# Patient Record
Sex: FEMALE | Race: OTHER | NOT HISPANIC OR LATINO
[De-identification: names, ages, dates, MRNs, and addresses within clinical notes are randomized per-mention and may not be internally consistent; named-entity substitution may affect disease eponyms.]

---

## 2018-08-28 ENCOUNTER — APPOINTMENT (OUTPATIENT)
Dept: GYNECOLOGIC ONCOLOGY | Facility: CLINIC | Age: 78
End: 2018-08-28
Payer: MEDICARE

## 2018-08-28 ENCOUNTER — RECORD ABSTRACTING (OUTPATIENT)
Age: 78
End: 2018-08-28

## 2018-08-28 VITALS
BODY MASS INDEX: 20.09 KG/M2 | DIASTOLIC BLOOD PRESSURE: 60 MMHG | HEIGHT: 66 IN | WEIGHT: 125 LBS | SYSTOLIC BLOOD PRESSURE: 166 MMHG

## 2018-08-28 DIAGNOSIS — M79.606 PAIN IN LEG, UNSPECIFIED: ICD-10-CM

## 2018-08-28 DIAGNOSIS — Z86.39 PERSONAL HISTORY OF OTHER ENDOCRINE, NUTRITIONAL AND METABOLIC DISEASE: ICD-10-CM

## 2018-08-28 DIAGNOSIS — Z87.39 PERSONAL HISTORY OF OTHER DISEASES OF THE MUSCULOSKELETAL SYSTEM AND CONNECTIVE TISSUE: ICD-10-CM

## 2018-08-28 DIAGNOSIS — N94.9 UNSPECIFIED CONDITION ASSOCIATED WITH FEMALE GENITAL ORGANS AND MENSTRUAL CYCLE: ICD-10-CM

## 2018-08-28 DIAGNOSIS — M54.5 LOW BACK PAIN: ICD-10-CM

## 2018-08-28 DIAGNOSIS — Z86.79 PERSONAL HISTORY OF OTHER DISEASES OF THE CIRCULATORY SYSTEM: ICD-10-CM

## 2018-08-28 DIAGNOSIS — Z78.9 OTHER SPECIFIED HEALTH STATUS: ICD-10-CM

## 2018-08-28 DIAGNOSIS — I25.2 OLD MYOCARDIAL INFARCTION: ICD-10-CM

## 2018-08-28 DIAGNOSIS — Z80.3 FAMILY HISTORY OF MALIGNANT NEOPLASM OF BREAST: ICD-10-CM

## 2018-08-28 PROCEDURE — 99204 OFFICE O/P NEW MOD 45 MIN: CPT

## 2018-08-28 RX ORDER — INSULIN ASPART 100 [IU]/ML
INJECTION, SOLUTION INTRAVENOUS; SUBCUTANEOUS
Refills: 0 | Status: ACTIVE | COMMUNITY

## 2018-08-28 RX ORDER — TRAMADOL HYDROCHLORIDE 25 MG/1
TABLET, COATED ORAL
Refills: 0 | Status: ACTIVE | COMMUNITY

## 2018-08-28 RX ORDER — ENALAPRIL MALEATE 5 MG/1
TABLET ORAL
Refills: 0 | Status: ACTIVE | COMMUNITY

## 2020-03-25 ENCOUNTER — INPATIENT (INPATIENT)
Facility: HOSPITAL | Age: 80
LOS: 5 days | Discharge: HOME | DRG: 481 | End: 2020-03-31
Attending: FAMILY MEDICINE | Admitting: INTERNAL MEDICINE
Payer: MEDICARE

## 2020-03-25 ENCOUNTER — TRANSCRIPTION ENCOUNTER (OUTPATIENT)
Age: 80
End: 2020-03-25

## 2020-03-25 VITALS
HEART RATE: 78 BPM | RESPIRATION RATE: 15 BRPM | WEIGHT: 143.96 LBS | OXYGEN SATURATION: 97 % | SYSTOLIC BLOOD PRESSURE: 180 MMHG | DIASTOLIC BLOOD PRESSURE: 80 MMHG | TEMPERATURE: 97 F

## 2020-03-25 DIAGNOSIS — S72.002A FRACTURE OF UNSPECIFIED PART OF NECK OF LEFT FEMUR, INITIAL ENCOUNTER FOR CLOSED FRACTURE: ICD-10-CM

## 2020-03-25 DIAGNOSIS — Z98.890 OTHER SPECIFIED POSTPROCEDURAL STATES: Chronic | ICD-10-CM

## 2020-03-25 LAB
ALBUMIN SERPL ELPH-MCNC: 3.5 G/DL — SIGNIFICANT CHANGE UP (ref 3.3–5)
ALP SERPL-CCNC: 84 U/L — SIGNIFICANT CHANGE UP (ref 40–120)
ALT FLD-CCNC: 18 U/L — SIGNIFICANT CHANGE UP (ref 12–78)
ANION GAP SERPL CALC-SCNC: 7 MMOL/L — SIGNIFICANT CHANGE UP (ref 5–17)
APPEARANCE UR: ABNORMAL
APTT BLD: 33.2 SEC — SIGNIFICANT CHANGE UP (ref 28.5–37)
AST SERPL-CCNC: 13 U/L — LOW (ref 15–37)
BASOPHILS # BLD AUTO: 0.02 K/UL — SIGNIFICANT CHANGE UP (ref 0–0.2)
BASOPHILS NFR BLD AUTO: 0.2 % — SIGNIFICANT CHANGE UP (ref 0–2)
BILIRUB SERPL-MCNC: 0.6 MG/DL — SIGNIFICANT CHANGE UP (ref 0.2–1.2)
BILIRUB UR-MCNC: NEGATIVE — SIGNIFICANT CHANGE UP
BUN SERPL-MCNC: 17 MG/DL — SIGNIFICANT CHANGE UP (ref 7–23)
CALCIUM SERPL-MCNC: 9.3 MG/DL — SIGNIFICANT CHANGE UP (ref 8.5–10.1)
CHLORIDE SERPL-SCNC: 106 MMOL/L — SIGNIFICANT CHANGE UP (ref 96–108)
CO2 SERPL-SCNC: 28 MMOL/L — SIGNIFICANT CHANGE UP (ref 22–31)
COLOR SPEC: YELLOW — SIGNIFICANT CHANGE UP
CREAT SERPL-MCNC: 0.63 MG/DL — SIGNIFICANT CHANGE UP (ref 0.5–1.3)
DIFF PNL FLD: NEGATIVE — SIGNIFICANT CHANGE UP
EOSINOPHIL # BLD AUTO: 0.02 K/UL — SIGNIFICANT CHANGE UP (ref 0–0.5)
EOSINOPHIL NFR BLD AUTO: 0.2 % — SIGNIFICANT CHANGE UP (ref 0–6)
GLUCOSE SERPL-MCNC: 191 MG/DL — HIGH (ref 70–99)
GLUCOSE UR QL: 1000 MG/DL
HCT VFR BLD CALC: 39.1 % — SIGNIFICANT CHANGE UP (ref 34.5–45)
HGB BLD-MCNC: 13.5 G/DL — SIGNIFICANT CHANGE UP (ref 11.5–15.5)
IMM GRANULOCYTES NFR BLD AUTO: 0.7 % — SIGNIFICANT CHANGE UP (ref 0–1.5)
INR BLD: 1.07 RATIO — SIGNIFICANT CHANGE UP (ref 0.88–1.16)
KETONES UR-MCNC: ABNORMAL
LEUKOCYTE ESTERASE UR-ACNC: NEGATIVE — SIGNIFICANT CHANGE UP
LYMPHOCYTES # BLD AUTO: 1.02 K/UL — SIGNIFICANT CHANGE UP (ref 1–3.3)
LYMPHOCYTES # BLD AUTO: 9 % — LOW (ref 13–44)
MCHC RBC-ENTMCNC: 30 PG — SIGNIFICANT CHANGE UP (ref 27–34)
MCHC RBC-ENTMCNC: 34.5 GM/DL — SIGNIFICANT CHANGE UP (ref 32–36)
MCV RBC AUTO: 86.9 FL — SIGNIFICANT CHANGE UP (ref 80–100)
MONOCYTES # BLD AUTO: 0.83 K/UL — SIGNIFICANT CHANGE UP (ref 0–0.9)
MONOCYTES NFR BLD AUTO: 7.3 % — SIGNIFICANT CHANGE UP (ref 2–14)
NEUTROPHILS # BLD AUTO: 9.42 K/UL — HIGH (ref 1.8–7.4)
NEUTROPHILS NFR BLD AUTO: 82.6 % — HIGH (ref 43–77)
NITRITE UR-MCNC: NEGATIVE — SIGNIFICANT CHANGE UP
NRBC # BLD: 0 /100 WBCS — SIGNIFICANT CHANGE UP (ref 0–0)
PH UR: 8 — SIGNIFICANT CHANGE UP (ref 5–8)
PLATELET # BLD AUTO: 228 K/UL — SIGNIFICANT CHANGE UP (ref 150–400)
POTASSIUM SERPL-MCNC: 4 MMOL/L — SIGNIFICANT CHANGE UP (ref 3.5–5.3)
POTASSIUM SERPL-SCNC: 4 MMOL/L — SIGNIFICANT CHANGE UP (ref 3.5–5.3)
PROT SERPL-MCNC: 6.5 G/DL — SIGNIFICANT CHANGE UP (ref 6–8.3)
PROT UR-MCNC: 15
PROTHROM AB SERPL-ACNC: 12 SEC — SIGNIFICANT CHANGE UP (ref 10–12.9)
RBC # BLD: 4.5 M/UL — SIGNIFICANT CHANGE UP (ref 3.8–5.2)
RBC # FLD: 12.9 % — SIGNIFICANT CHANGE UP (ref 10.3–14.5)
SODIUM SERPL-SCNC: 141 MMOL/L — SIGNIFICANT CHANGE UP (ref 135–145)
SP GR SPEC: 1.01 — SIGNIFICANT CHANGE UP (ref 1.01–1.02)
UROBILINOGEN FLD QL: NEGATIVE — SIGNIFICANT CHANGE UP
WBC # BLD: 11.39 K/UL — HIGH (ref 3.8–10.5)
WBC # FLD AUTO: 11.39 K/UL — HIGH (ref 3.8–10.5)

## 2020-03-25 PROCEDURE — 73552 X-RAY EXAM OF FEMUR 2/>: CPT | Mod: 26,LT

## 2020-03-25 PROCEDURE — 71045 X-RAY EXAM CHEST 1 VIEW: CPT | Mod: 26

## 2020-03-25 PROCEDURE — 72100 X-RAY EXAM L-S SPINE 2/3 VWS: CPT | Mod: 26

## 2020-03-25 PROCEDURE — 99223 1ST HOSP IP/OBS HIGH 75: CPT | Mod: AI

## 2020-03-25 PROCEDURE — 73502 X-RAY EXAM HIP UNI 2-3 VIEWS: CPT | Mod: 26,LT

## 2020-03-25 PROCEDURE — 99285 EMERGENCY DEPT VISIT HI MDM: CPT

## 2020-03-25 RX ORDER — INFLUENZA VIRUS VACCINE 15; 15; 15; 15 UG/.5ML; UG/.5ML; UG/.5ML; UG/.5ML
0.5 SUSPENSION INTRAMUSCULAR ONCE
Refills: 0 | Status: DISCONTINUED | OUTPATIENT
Start: 2020-03-25 | End: 2020-03-31

## 2020-03-25 RX ORDER — HEPARIN SODIUM 5000 [USP'U]/ML
5000 INJECTION INTRAVENOUS; SUBCUTANEOUS EVERY 8 HOURS
Refills: 0 | Status: COMPLETED | OUTPATIENT
Start: 2020-03-25 | End: 2020-03-25

## 2020-03-25 RX ORDER — INSULIN LISPRO 100/ML
VIAL (ML) SUBCUTANEOUS
Refills: 0 | Status: DISCONTINUED | OUTPATIENT
Start: 2020-03-25 | End: 2020-03-26

## 2020-03-25 RX ORDER — DEXTROSE 50 % IN WATER 50 %
25 SYRINGE (ML) INTRAVENOUS ONCE
Refills: 0 | Status: DISCONTINUED | OUTPATIENT
Start: 2020-03-25 | End: 2020-03-26

## 2020-03-25 RX ORDER — OXYCODONE HYDROCHLORIDE 5 MG/1
5 TABLET ORAL EVERY 6 HOURS
Refills: 0 | Status: DISCONTINUED | OUTPATIENT
Start: 2020-03-25 | End: 2020-03-26

## 2020-03-25 RX ORDER — SODIUM CHLORIDE 9 MG/ML
1000 INJECTION, SOLUTION INTRAVENOUS
Refills: 0 | Status: DISCONTINUED | OUTPATIENT
Start: 2020-03-25 | End: 2020-03-26

## 2020-03-25 RX ORDER — INSULIN LISPRO 100/ML
VIAL (ML) SUBCUTANEOUS AT BEDTIME
Refills: 0 | Status: DISCONTINUED | OUTPATIENT
Start: 2020-03-25 | End: 2020-03-26

## 2020-03-25 RX ORDER — ONDANSETRON 8 MG/1
4 TABLET, FILM COATED ORAL EVERY 6 HOURS
Refills: 0 | Status: DISCONTINUED | OUTPATIENT
Start: 2020-03-25 | End: 2020-03-26

## 2020-03-25 RX ORDER — DEXTROSE 50 % IN WATER 50 %
15 SYRINGE (ML) INTRAVENOUS ONCE
Refills: 0 | Status: DISCONTINUED | OUTPATIENT
Start: 2020-03-25 | End: 2020-03-26

## 2020-03-25 RX ORDER — HYDRALAZINE HCL 50 MG
10 TABLET ORAL ONCE
Refills: 0 | Status: COMPLETED | OUTPATIENT
Start: 2020-03-25 | End: 2020-03-25

## 2020-03-25 RX ORDER — ACETAMINOPHEN 500 MG
650 TABLET ORAL EVERY 6 HOURS
Refills: 0 | Status: DISCONTINUED | OUTPATIENT
Start: 2020-03-25 | End: 2020-03-26

## 2020-03-25 RX ORDER — GLUCAGON INJECTION, SOLUTION 0.5 MG/.1ML
1 INJECTION, SOLUTION SUBCUTANEOUS ONCE
Refills: 0 | Status: DISCONTINUED | OUTPATIENT
Start: 2020-03-25 | End: 2020-03-26

## 2020-03-25 RX ORDER — TRAMADOL HYDROCHLORIDE 50 MG/1
50 TABLET ORAL EVERY 6 HOURS
Refills: 0 | Status: DISCONTINUED | OUTPATIENT
Start: 2020-03-25 | End: 2020-03-26

## 2020-03-25 RX ORDER — DEXTROSE 50 % IN WATER 50 %
12.5 SYRINGE (ML) INTRAVENOUS ONCE
Refills: 0 | Status: DISCONTINUED | OUTPATIENT
Start: 2020-03-25 | End: 2020-03-26

## 2020-03-25 RX ADMIN — Medication 1: at 22:02

## 2020-03-25 RX ADMIN — OXYCODONE HYDROCHLORIDE 5 MILLIGRAM(S): 5 TABLET ORAL at 18:34

## 2020-03-25 RX ADMIN — TRAMADOL HYDROCHLORIDE 50 MILLIGRAM(S): 50 TABLET ORAL at 22:00

## 2020-03-25 RX ADMIN — SODIUM CHLORIDE 75 MILLILITER(S): 9 INJECTION, SOLUTION INTRAVENOUS at 16:00

## 2020-03-25 RX ADMIN — HEPARIN SODIUM 5000 UNIT(S): 5000 INJECTION INTRAVENOUS; SUBCUTANEOUS at 18:51

## 2020-03-25 RX ADMIN — SODIUM CHLORIDE 75 MILLILITER(S): 9 INJECTION, SOLUTION INTRAVENOUS at 18:52

## 2020-03-25 RX ADMIN — Medication 10 MILLIGRAM(S): at 17:11

## 2020-03-25 RX ADMIN — Medication 2: at 16:55

## 2020-03-25 RX ADMIN — HEPARIN SODIUM 5000 UNIT(S): 5000 INJECTION INTRAVENOUS; SUBCUTANEOUS at 21:14

## 2020-03-25 RX ADMIN — TRAMADOL HYDROCHLORIDE 50 MILLIGRAM(S): 50 TABLET ORAL at 21:14

## 2020-03-25 NOTE — H&P ADULT - ASSESSMENT
Pt is a 80 yo F presenting from home after a mechanical fall in her yard resulting in L hip fracture. PMH DM2 and pinched nerve, HTN.     L Hip Fracture:   -planned for intramedullary benjamin placement.   -patient is medically optimized for hip surgery.   -pain control as ordered.   -EKG NSR without any acute ST-T wave changes/no ischemic changes  -DVT ppx after OK'ed by ortho.     HTN: BP mildly elevated, will resume home ACE-inhibitor    DM2: Will place on diabetic diet after planned procedure.   -SSI  -check A1c.     DVT ppx: SCD's    Plan of care discussed with patient as well as patients daughter Leonie via telephone at patients request. Pt is a 80 yo F presenting from home after a mechanical fall in her yard resulting in L hip fracture. PMH DM2 and pinched nerve, HTN.     L Hip Fracture:   -planned for intramedullary benjamin placement.   -patient is medically optimized for hip surgery.   -pain control as ordered.   -EKG NSR without any acute ST-T wave changes/no ischemic changes  -DVT ppx after OK'ed by ortho.     HTN: BP mildly elevated. Hydralazine PRN  -called [facility] but patient has not picked up enalapril since Jan 2019. Will reach out to [facility] to reconcile home medications    DM2: Will place on diabetic diet after planned procedure.   -SSI  -check A1c.     DVT ppx: SCD's    Plan of care discussed with patient as well as patients daughter Leonie via telephone at patients request.

## 2020-03-25 NOTE — H&P ADULT - NSHPPHYSICALEXAM_GEN_ALL_CORE
T(C): 36.7 (03-25-20 @ 15:39), Max: 36.7 (03-25-20 @ 15:39)  HR: 80 (03-25-20 @ 15:39) (78 - 80)  BP: 178/68 (03-25-20 @ 15:39) (178/68 - 180/80)  RR: 16 (03-25-20 @ 15:39) (15 - 16)  SpO2: 98% (03-25-20 @ 15:39) (97% - 98%)  Wt(kg): --    Physical Exam:   GENERAL: well-groomed, well-developed, NAD  HEENT: head NC/AT; EOM intact, PERRLA, conjunctiva & sclera clear; hearing grossly intact, moist mucous membranes  NECK: supple, no JVD  RESPIRATORY: CTA B/L, no wheezing, rales, rhonchi or rubs  CARDIOVASCULAR: S1&S2, RRR, no murmurs or gallops  ABDOMEN: soft, non-tender, non-distended, + Bowel sounds x4 quadrants, no guarding, rebound or rigidity  MUSCULOSKELETAL:  no clubbing, cyanosis or edema of all 4 extremities  LYMPH: no cervical lymphadenopathy  VASCULAR: Radial pulses 2+ bilaterally, no varicose veins   SKIN: warm and dry, color normal  NEUROLOGIC: AA&O X3, CN2-12 intact w/ no focal deficits, no sensory loss, motor Strength 5/5 in UE & RLE, LLE ROM limited due to pain  Psych: Normal mood and affect, normal behavior

## 2020-03-25 NOTE — H&P ADULT - HISTORY OF PRESENT ILLNESS
Pt is a 80 yo F presenting from home after a mechanical fall in her yard resulting in L hip fracture. PMH DM2 and pinched nerve, HTN.   Patient seen and examined at bedside. Patient states that today she was in her yard today with her dog when she fell on her left side due to some sand bags being on the floor. She denies any head trauma, syncope or pre-syncopal symptoms. She tripped due to the sand bags being on the floor. She has L hip pain 10/10 in severity worse with movement, improved by laying still, pain is sharp. No other complaints. Denies headaches, nausea, vomiting, chest pain, SOB, palpitations, abdominal pain, constipation, diarrhea, melena, hematochezia, dysuria. She has never had any adverse reactions to anesthesia in the past. She denies past hx of TIA/MI/CVA.   In ED patient noted to have L hip fracture.

## 2020-03-25 NOTE — CONSULT NOTE ADULT - ASSESSMENT
Assessment/Plan:  79y Female with LEFT intertrochanteric fracture    -Pain control as needed  -Strict bedrest, NWB LEFT lower extremity   -Plan for IMN of LEFT hip with Dr. Quarles  -NPO, IVF while NPO  -FU preop labs  -Needs medical optimization for OR  -Needs documentation of medical clearance  -Will discuss with attending, and advise if plan changes Assessment/Plan:  79y Female with LEFT intertrochanteric fracture    -Pain control as needed  -Strict bedrest, NWB LEFT lower extremity   -Plan for IMN of left   -NPO after midnight, IVF while NPO  -FU preop labs  -FU medical optimization for OR  -FU documentation of medical clearance  -Medical management per primary team  -Discussed with attending on call, Dr. Quarles   -Plan for IMN of LEFT hip with Dr. Santoyo tomorrow 3/26 Assessment/Plan:  79y Female with LEFT intertrochanteric fracture    -Pain control as needed  -Strict bedrest, NWB LEFT lower extremity   -Plan for IMN of left   -NPO after midnight, IVF while NPO  -FU preop labs  -Please hold chemical DVT ppx for OR tomorrow   -FU medical optimization for OR  -FU documentation of medical clearance  -Medical management per primary team  -Discussed with attending on call, Dr. Quarles   -Plan for IMN of LEFT hip with Dr. Santoyo tomorrow 3/26

## 2020-03-25 NOTE — ED PROVIDER NOTE - ATTENDING CONTRIBUTION TO CARE
78 yo white female with trip and fall earlier today and now presents to ED c/o left hip pain. Did not hit head. No neck, chest, abdominal or back pain. Exam revealed white female in mild distress secondary to hip pain with marked tenderness to palpation proximal left hip which is externally rotated and foreshortened. I agree with plan and management outlined by PA.

## 2020-03-25 NOTE — CONSULT NOTE ADULT - SUBJECTIVE AND OBJECTIVE BOX
79y Female presents with LEFT hip pain s/p mechanical fall. Pt was outside with her dogs when she tripped over sandbags and fell onto her left side. Unable to ambulate since fall. Denies numbness/tingling in the affected extremity. Denies head strike/LOC/other orthopedic injuries at this time. Patient ambulates without assistance at baseline. Pt makes her own medical decisions. Discussed pt's injury /status with daughter over the phone (Susy, [phone])    PAST MEDICAL & SURGICAL HISTORY:  DM (diabetes mellitus)    Home Medications:  NovoLOG:  (25 Mar 2020 13:56)  traMADol 50 mg oral tablet:  (25 Mar 2020 15:58)    Allergies    No Known Allergies    Intolerances                              13.5   11.39 )-----------( 228      ( 25 Mar 2020 14:20 )             39.1     03-25    141  |  106  |  17  ----------------------------<  191<H>  4.0   |  28  |  0.63    Ca    9.3      25 Mar 2020 14:20    TPro  6.5  /  Alb  3.5  /  TBili  0.6  /  DBili  x   /  AST  13<L>  /  ALT  18  /  AlkPhos  84  03-25    PT/INR - ( 25 Mar 2020 14:20 )   PT: 12.0 sec;   INR: 1.07 ratio         PTT - ( 25 Mar 2020 14:20 )  PTT:33.2 sec      Vital Signs Last 24 Hrs  T(C): 36.7 (25 Mar 2020 15:39), Max: 36.7 (25 Mar 2020 15:39)  T(F): 98 (25 Mar 2020 15:39), Max: 98 (25 Mar 2020 15:39)  HR: 80 (25 Mar 2020 15:39) (78 - 80)  BP: 178/68 (25 Mar 2020 15:39) (178/68 - 180/80)  BP(mean): --  RR: 16 (25 Mar 2020 15:39) (15 - 16)  SpO2: 98% (25 Mar 2020 15:39) (97% - 98%)    PHYSICAL EXAM  General: NAD, Awake and Alert    LEFT LE:   Skin intact, no ecchymosis or swelling  Shortened and externally rotated   TTP over the bony prominences of the hip  NTTP over the bony prominences of the knee/ankle/foot/toes  L2-S1 SILT  +EHL/FHL/TA/GSC  +DP/PT pulses  Calf nontender  Compartments soft and compressible      SECONDARY EXAM: Benign, Skin intact, SILT throughout, motor grossly intact throughout, no other orthopedic injuries at this time, compartments soft and compressible    SPINE: Skin intact, no bony tenderness or step-offs appreciated throughout cervical/thoracic/lumbar/sacral spine    B/l UE: Skin intact, no erythema, ecchymosis, edema, gross deformity, NTTP over the bony prominences of the shoulder/elbow/wrist/hand, painless passive/active ROM of the shoulder/elbow/wrist/hand, C5-T1 SILT, motor grossly intact throughout axillary/musculocutaenous/radial/median/ulnar nerves, + radial pulse    RLE: Skin intact, no erythema, ecchymosis, edema, gross deformity, NTTP over the bony prominences of the hip/knee/ankle/foot, painless passive/active ROM of the hip/knee/ankle/foot, L2-S1 SILT, motor grossly intact throughout hip flexors/quads/hams/TA/EHL/FHL/GSC, + DP/PT pulses, no pain with log roll, no pain on axial loading, compartments soft and compressible, calf nontender      IMAGING:  XR LEFT Hip: LEFT intertrochanteric fracture  XR LEFT Femur: No other evidence of other fracture or dislocation  XR L Spine: No evidence of fracture or dislocation.

## 2020-03-25 NOTE — ED PROVIDER NOTE - OBJECTIVE STATEMENT
Pt is a 80 yo female with pmhx of dm insulin dependent and sciatica on tramadol c/o of left hip pain s/p mechanical fall. Pt states she was letting her daughters dog out turned around and fell over sand bags no loc no headache neck pain no blood thinner use. Pt c/o of left hip and leg pain. Pt given 100 mcg of Fentanyl. Pt denies any cough chest pain sob fever chills numbness tingling.

## 2020-03-25 NOTE — ED PROVIDER NOTE - MUSCULOSKELETAL, MLM
Spine appears normal, left leg rotated and shorter lrom ttp to hip and thigh nvi normal pulses cap refill sensation

## 2020-03-26 ENCOUNTER — TRANSCRIPTION ENCOUNTER (OUTPATIENT)
Age: 80
End: 2020-03-26

## 2020-03-26 DIAGNOSIS — S72.142A DISPLACED INTERTROCHANTERIC FRACTURE OF LEFT FEMUR, INITIAL ENCOUNTER FOR CLOSED FRACTURE: ICD-10-CM

## 2020-03-26 DIAGNOSIS — E11.9 TYPE 2 DIABETES MELLITUS WITHOUT COMPLICATIONS: ICD-10-CM

## 2020-03-26 DIAGNOSIS — I10 ESSENTIAL (PRIMARY) HYPERTENSION: ICD-10-CM

## 2020-03-26 DIAGNOSIS — S72.002A FRACTURE OF UNSPECIFIED PART OF NECK OF LEFT FEMUR, INITIAL ENCOUNTER FOR CLOSED FRACTURE: ICD-10-CM

## 2020-03-26 LAB
ANION GAP SERPL CALC-SCNC: 6 MMOL/L — SIGNIFICANT CHANGE UP (ref 5–17)
APTT BLD: 37.7 SEC — HIGH (ref 28.5–37)
BASOPHILS # BLD AUTO: 0.02 K/UL — SIGNIFICANT CHANGE UP (ref 0–0.2)
BASOPHILS NFR BLD AUTO: 0.2 % — SIGNIFICANT CHANGE UP (ref 0–2)
BUN SERPL-MCNC: 20 MG/DL — SIGNIFICANT CHANGE UP (ref 7–23)
CALCIUM SERPL-MCNC: 8.8 MG/DL — SIGNIFICANT CHANGE UP (ref 8.5–10.1)
CHLORIDE SERPL-SCNC: 106 MMOL/L — SIGNIFICANT CHANGE UP (ref 96–108)
CO2 SERPL-SCNC: 27 MMOL/L — SIGNIFICANT CHANGE UP (ref 22–31)
CREAT SERPL-MCNC: 0.59 MG/DL — SIGNIFICANT CHANGE UP (ref 0.5–1.3)
EOSINOPHIL # BLD AUTO: 0.01 K/UL — SIGNIFICANT CHANGE UP (ref 0–0.5)
EOSINOPHIL NFR BLD AUTO: 0.1 % — SIGNIFICANT CHANGE UP (ref 0–6)
GLUCOSE SERPL-MCNC: 213 MG/DL — HIGH (ref 70–99)
HBA1C BLD-MCNC: 7.2 % — HIGH (ref 4–5.6)
HCT VFR BLD CALC: 36.2 % — SIGNIFICANT CHANGE UP (ref 34.5–45)
HGB BLD-MCNC: 12.1 G/DL — SIGNIFICANT CHANGE UP (ref 11.5–15.5)
IMM GRANULOCYTES NFR BLD AUTO: 0.3 % — SIGNIFICANT CHANGE UP (ref 0–1.5)
INR BLD: 1.18 RATIO — HIGH (ref 0.88–1.16)
LYMPHOCYTES # BLD AUTO: 0.83 K/UL — LOW (ref 1–3.3)
LYMPHOCYTES # BLD AUTO: 7.4 % — LOW (ref 13–44)
MCHC RBC-ENTMCNC: 29.3 PG — SIGNIFICANT CHANGE UP (ref 27–34)
MCHC RBC-ENTMCNC: 33.4 GM/DL — SIGNIFICANT CHANGE UP (ref 32–36)
MCV RBC AUTO: 87.7 FL — SIGNIFICANT CHANGE UP (ref 80–100)
MONOCYTES # BLD AUTO: 1.19 K/UL — HIGH (ref 0–0.9)
MONOCYTES NFR BLD AUTO: 10.7 % — SIGNIFICANT CHANGE UP (ref 2–14)
NEUTROPHILS # BLD AUTO: 9.07 K/UL — HIGH (ref 1.8–7.4)
NEUTROPHILS NFR BLD AUTO: 81.3 % — HIGH (ref 43–77)
NRBC # BLD: 0 /100 WBCS — SIGNIFICANT CHANGE UP (ref 0–0)
PLATELET # BLD AUTO: 204 K/UL — SIGNIFICANT CHANGE UP (ref 150–400)
POTASSIUM SERPL-MCNC: 4.3 MMOL/L — SIGNIFICANT CHANGE UP (ref 3.5–5.3)
POTASSIUM SERPL-SCNC: 4.3 MMOL/L — SIGNIFICANT CHANGE UP (ref 3.5–5.3)
PROTHROM AB SERPL-ACNC: 13.3 SEC — HIGH (ref 10–12.9)
RBC # BLD: 4.13 M/UL — SIGNIFICANT CHANGE UP (ref 3.8–5.2)
RBC # FLD: 13 % — SIGNIFICANT CHANGE UP (ref 10.3–14.5)
SODIUM SERPL-SCNC: 139 MMOL/L — SIGNIFICANT CHANGE UP (ref 135–145)
WBC # BLD: 11.15 K/UL — HIGH (ref 3.8–10.5)
WBC # FLD AUTO: 11.15 K/UL — HIGH (ref 3.8–10.5)

## 2020-03-26 PROCEDURE — 99233 SBSQ HOSP IP/OBS HIGH 50: CPT

## 2020-03-26 RX ORDER — OXYCODONE HYDROCHLORIDE 5 MG/1
5 TABLET ORAL EVERY 4 HOURS
Refills: 0 | Status: DISCONTINUED | OUTPATIENT
Start: 2020-03-26 | End: 2020-03-30

## 2020-03-26 RX ORDER — INSULIN LISPRO 100/ML
VIAL (ML) SUBCUTANEOUS EVERY 6 HOURS
Refills: 0 | Status: DISCONTINUED | OUTPATIENT
Start: 2020-03-26 | End: 2020-03-26

## 2020-03-26 RX ORDER — OXYCODONE HYDROCHLORIDE 5 MG/1
2.5 TABLET ORAL EVERY 4 HOURS
Refills: 0 | Status: DISCONTINUED | OUTPATIENT
Start: 2020-03-26 | End: 2020-03-30

## 2020-03-26 RX ORDER — ONDANSETRON 8 MG/1
4 TABLET, FILM COATED ORAL ONCE
Refills: 0 | Status: DISCONTINUED | OUTPATIENT
Start: 2020-03-26 | End: 2020-03-26

## 2020-03-26 RX ORDER — INSULIN LISPRO 100/ML
VIAL (ML) SUBCUTANEOUS
Refills: 0 | Status: DISCONTINUED | OUTPATIENT
Start: 2020-03-26 | End: 2020-03-31

## 2020-03-26 RX ORDER — LANOLIN ALCOHOL/MO/W.PET/CERES
3 CREAM (GRAM) TOPICAL AT BEDTIME
Refills: 0 | Status: DISCONTINUED | OUTPATIENT
Start: 2020-03-26 | End: 2020-03-31

## 2020-03-26 RX ORDER — SENNA PLUS 8.6 MG/1
2 TABLET ORAL AT BEDTIME
Refills: 0 | Status: DISCONTINUED | OUTPATIENT
Start: 2020-03-26 | End: 2020-03-31

## 2020-03-26 RX ORDER — SODIUM CHLORIDE 9 MG/ML
1000 INJECTION, SOLUTION INTRAVENOUS
Refills: 0 | Status: DISCONTINUED | OUTPATIENT
Start: 2020-03-26 | End: 2020-03-31

## 2020-03-26 RX ORDER — ONDANSETRON 8 MG/1
4 TABLET, FILM COATED ORAL EVERY 6 HOURS
Refills: 0 | Status: DISCONTINUED | OUTPATIENT
Start: 2020-03-26 | End: 2020-03-31

## 2020-03-26 RX ORDER — TRAMADOL HYDROCHLORIDE 50 MG/1
25 TABLET ORAL EVERY 4 HOURS
Refills: 0 | Status: DISCONTINUED | OUTPATIENT
Start: 2020-03-26 | End: 2020-03-30

## 2020-03-26 RX ORDER — CEFAZOLIN SODIUM 1 G
2000 VIAL (EA) INJECTION ONCE
Refills: 0 | Status: DISCONTINUED | OUTPATIENT
Start: 2020-03-26 | End: 2020-03-26

## 2020-03-26 RX ORDER — TRAMADOL HYDROCHLORIDE 50 MG/1
0 TABLET ORAL
Qty: 0 | Refills: 0 | DISCHARGE

## 2020-03-26 RX ORDER — ENOXAPARIN SODIUM 100 MG/ML
40 INJECTION SUBCUTANEOUS EVERY 24 HOURS
Refills: 0 | Status: DISCONTINUED | OUTPATIENT
Start: 2020-03-27 | End: 2020-03-31

## 2020-03-26 RX ORDER — INSULIN LISPRO 100/ML
VIAL (ML) SUBCUTANEOUS AT BEDTIME
Refills: 0 | Status: DISCONTINUED | OUTPATIENT
Start: 2020-03-26 | End: 2020-03-31

## 2020-03-26 RX ORDER — DEXTROSE 50 % IN WATER 50 %
25 SYRINGE (ML) INTRAVENOUS ONCE
Refills: 0 | Status: DISCONTINUED | OUTPATIENT
Start: 2020-03-26 | End: 2020-03-31

## 2020-03-26 RX ORDER — ACETAMINOPHEN 500 MG
1000 TABLET ORAL ONCE
Refills: 0 | Status: COMPLETED | OUTPATIENT
Start: 2020-03-26 | End: 2020-03-26

## 2020-03-26 RX ORDER — CEFAZOLIN SODIUM 1 G
2000 VIAL (EA) INJECTION EVERY 8 HOURS
Refills: 0 | Status: COMPLETED | OUTPATIENT
Start: 2020-03-26 | End: 2020-03-27

## 2020-03-26 RX ORDER — HYDROMORPHONE HYDROCHLORIDE 2 MG/ML
0.5 INJECTION INTRAMUSCULAR; INTRAVENOUS; SUBCUTANEOUS
Refills: 0 | Status: DISCONTINUED | OUTPATIENT
Start: 2020-03-26 | End: 2020-03-26

## 2020-03-26 RX ORDER — OXYCODONE HYDROCHLORIDE 5 MG/1
5 TABLET ORAL ONCE
Refills: 0 | Status: DISCONTINUED | OUTPATIENT
Start: 2020-03-26 | End: 2020-03-26

## 2020-03-26 RX ORDER — MORPHINE SULFATE 50 MG/1
2 CAPSULE, EXTENDED RELEASE ORAL
Refills: 0 | Status: DISCONTINUED | OUTPATIENT
Start: 2020-03-26 | End: 2020-03-30

## 2020-03-26 RX ORDER — ASPIRIN/CALCIUM CARB/MAGNESIUM 324 MG
1 TABLET ORAL
Qty: 60 | Refills: 0
Start: 2020-03-26 | End: 2020-04-24

## 2020-03-26 RX ORDER — ACETAMINOPHEN 500 MG
975 TABLET ORAL EVERY 8 HOURS
Refills: 0 | Status: DISCONTINUED | OUTPATIENT
Start: 2020-03-26 | End: 2020-03-31

## 2020-03-26 RX ORDER — INSULIN ASPART 100 [IU]/ML
0 INJECTION, SOLUTION SUBCUTANEOUS
Qty: 0 | Refills: 0 | DISCHARGE

## 2020-03-26 RX ORDER — HYDROMORPHONE HYDROCHLORIDE 2 MG/ML
1 INJECTION INTRAMUSCULAR; INTRAVENOUS; SUBCUTANEOUS
Refills: 0 | Status: DISCONTINUED | OUTPATIENT
Start: 2020-03-26 | End: 2020-03-26

## 2020-03-26 RX ORDER — SODIUM CHLORIDE 9 MG/ML
1000 INJECTION, SOLUTION INTRAVENOUS
Refills: 0 | Status: DISCONTINUED | OUTPATIENT
Start: 2020-03-26 | End: 2020-03-26

## 2020-03-26 RX ADMIN — Medication 4: at 17:57

## 2020-03-26 RX ADMIN — Medication 400 MILLIGRAM(S): at 13:20

## 2020-03-26 RX ADMIN — SODIUM CHLORIDE 75 MILLILITER(S): 9 INJECTION, SOLUTION INTRAVENOUS at 00:28

## 2020-03-26 RX ADMIN — Medication 3: at 00:36

## 2020-03-26 RX ADMIN — SODIUM CHLORIDE 75 MILLILITER(S): 9 INJECTION, SOLUTION INTRAVENOUS at 12:57

## 2020-03-26 RX ADMIN — Medication 1000 MILLIGRAM(S): at 13:30

## 2020-03-26 RX ADMIN — Medication 100 MILLIGRAM(S): at 22:24

## 2020-03-26 RX ADMIN — HYDROMORPHONE HYDROCHLORIDE 0.5 MILLIGRAM(S): 2 INJECTION INTRAMUSCULAR; INTRAVENOUS; SUBCUTANEOUS at 12:57

## 2020-03-26 RX ADMIN — TRAMADOL HYDROCHLORIDE 50 MILLIGRAM(S): 50 TABLET ORAL at 05:14

## 2020-03-26 RX ADMIN — SENNA PLUS 2 TABLET(S): 8.6 TABLET ORAL at 22:00

## 2020-03-26 RX ADMIN — SODIUM CHLORIDE 75 MILLILITER(S): 9 INJECTION, SOLUTION INTRAVENOUS at 15:57

## 2020-03-26 RX ADMIN — TRAMADOL HYDROCHLORIDE 50 MILLIGRAM(S): 50 TABLET ORAL at 05:59

## 2020-03-26 RX ADMIN — HYDROMORPHONE HYDROCHLORIDE 0.5 MILLIGRAM(S): 2 INJECTION INTRAMUSCULAR; INTRAVENOUS; SUBCUTANEOUS at 13:07

## 2020-03-26 RX ADMIN — SODIUM CHLORIDE 75 MILLILITER(S): 9 INJECTION, SOLUTION INTRAVENOUS at 08:06

## 2020-03-26 RX ADMIN — Medication 1: at 06:16

## 2020-03-26 RX ADMIN — Medication 1 DROP(S): at 22:24

## 2020-03-26 RX ADMIN — Medication 975 MILLIGRAM(S): at 21:59

## 2020-03-26 NOTE — PROGRESS NOTE ADULT - SUBJECTIVE AND OBJECTIVE BOX
HPI:  Pt is a 78 yo F presenting from home after a mechanical fall in her yard resulting in L hip fracture. PMH DM2 and pinched nerve, HTN.   Patient seen and examined at bedside. Patient states that today she was in her yard today with her dog when she fell on her left side due to some sand bags being on the floor. She denies any head trauma, syncope or pre-syncopal symptoms. She tripped due to the sand bags being on the floor. She has L hip pain 10/10 in severity worse with movement, improved by laying still, pain is sharp. No other complaints. Denies headaches, nausea, vomiting, chest pain, SOB, palpitations, abdominal pain, constipation, diarrhea, melena, hematochezia, dysuria. She has never had any adverse reactions to anesthesia in the past. She denies past hx of TIA/MI/CVA.   In ED patient noted to have L hip fracture. (25 Mar 2020 16:00)    Pt seen in PACU today at bedside.  Pt is s/p gamma nail of left hip for left hip fracture.  Pt doing well.  Pt states that she has a dry throat.  States the Ice chips are helping. Pt also has a feeling of being cold.  Currently under warm blanket.    Denies pain at surgical site.  Denies SOB, chest pain, back pain, fevers, N/V, abdominal pain.  Awaiting to be transported back to her room.      REVIEW OF SYSTEMS:    CONSTITUTIONAL: Feels cold, better with warm blankets.  EYES/ENT: mild throat dryness/discomfort  RESPIRATORY: No cough, wheezing, hemoptysis; No shortness of breath  CARDIOVASCULAR: No chest pain or palpitations  GASTROINTESTINAL: No abdominal, nausea, vomiting, or hematemesis; No diarrhea or constipation. No melena or hematochezia.  GENITOURINARY: Had woodall during procedure-awaiting void check.   NEUROLOGICAL: No dizziness, numbness, or weakness  SKIN: No itching, burning, rashes, or lesions   All other review of systems is negative unless indicated above.    VITAL SIGNS:  ICU Vital Signs Last 24 Hrs  T(C): 36.4 (26 Mar 2020 12:57), Max: 37.3 (26 Mar 2020 00:38)  T(F): 97.5 (26 Mar 2020 12:57), Max: 99.1 (26 Mar 2020 00:38)  HR: 80 (26 Mar 2020 13:40) (66 - 96)  BP: 146/40 (26 Mar 2020 13:40) (138/63 - 180/76)  BP(mean): --  ABP: --  ABP(mean): --  RR: 11 (26 Mar 2020 13:40) (10 - 18)  SpO2: 99% (26 Mar 2020 13:40) (96% - 100%)          PHYSICAL EXAM:     GENERAL: no acute distress  HEENT: NC/AT, EOMI, neck supple, MMM  RESPIRATORY: LCTAB/L, no rhonchi, rales, or wheezing  CARDIOVASCULAR: RRR, no murmurs, gallops, rubs  ABDOMINAL: soft, non-tender, non-distended, positive bowel sounds   EXTREMITIES: SCD in place.  Distal pulses intact.  Good sensation b/l.  Able to move her toes.  NEUROLOGICAL: alert and oriented x 3, non-focal  SKIN: no rashes or lesions   MUSCULOSKELETAL: left hip with aquacel in place. Ice pack next to left hip.                            12.1   11.15 )-----------( 204      ( 26 Mar 2020 05:14 )             36.2     03-26    139  |  106  |  20  ----------------------------<  213<H>  4.3   |  27  |  0.59    Ca    8.8      26 Mar 2020 05:14    TPro  6.5  /  Alb  3.5  /  TBili  0.6  /  DBili  x   /  AST  13<L>  /  ALT  18  /  AlkPhos  84  03-25      CAPILLARY BLOOD GLUCOSE      POCT Blood Glucose.: 202 mg/dL (26 Mar 2020 12:36)  POCT Blood Glucose.: 181 mg/dL (26 Mar 2020 07:58)  POCT Blood Glucose.: 194 mg/dL (26 Mar 2020 06:04)  POCT Blood Glucose.: 264 mg/dL (26 Mar 2020 00:24)  POCT Blood Glucose.: 251 mg/dL (25 Mar 2020 21:38)  POCT Blood Glucose.: 202 mg/dL (25 Mar 2020 16:49)      MEDICATIONS  (STANDING):  enalapril 20 milliGRAM(s) Oral daily  influenza   Vaccine 0.5 milliLiter(s) IntraMuscular once  lactated ringers. 1000 milliLiter(s) (75 mL/Hr) IV Continuous <Continuous>

## 2020-03-26 NOTE — DISCHARGE NOTE PROVIDER - NSDCMRMEDTOKEN_GEN_ALL_CORE_FT
enalapril 20 mg oral tablet: 1 tab(s) orally once a day  NovoLOG Mix 70/30 subcutaneous suspension: Inject subcutaneously twice a day as per sliding scale in the morning and supper.    Morning: ~10units  Supper: ~14units  traMADol 50 mg oral tablet: 1 tab(s) orally every 6 hours, As Needed Aspirin Enteric Coated 325 mg oral delayed release tablet: 1 tab(s) orally 2 times a day MDD:2  To prevent blood clots (DVT) take for 30 days after surgery  enalapril 20 mg oral tablet: 1 tab(s) orally once a day  NovoLOG Mix 70/30 subcutaneous suspension: Inject subcutaneously twice a day as per sliding scale in the morning and supper.    Morning: ~10units  Supper: ~14units  traMADol 50 mg oral tablet: 1 tab(s) orally every 6 hours, As Needed acetaminophen 325 mg oral tablet: 2 tab(s) orally every 6 hours, As Needed, Mild pain  Aspirin Enteric Coated 325 mg oral delayed release tablet: 1 tab(s) orally 2 times a day MDD:2  To prevent blood clots (DVT) take for 30 days after surgery  calcium carbonate 500 mg (200 mg elemental calcium) oral tablet, chewable: 2 tab(s) orally 3 times a day, As needed, Heartburn  enalapril 20 mg oral tablet: 1 tab(s) orally once a day  guaiFENesin 100 mg/5 mL oral liquid: 5 milliliter(s) orally every 6 hours, As needed, Cough  insulin glargine: 12 unit(s) subcutaneous once a day (at bedtime)  insulin lispro (concentrated) 200 units/mL subcutaneous solution: 4 unit(s) subcutaneous 3 times a day with meals  insulin lispro (concentrated) 200 units/mL subcutaneous solution:  subcutaneous per standard sliding scale three times daily with meals  melatonin 3 mg oral tablet: 1 tab(s) orally once a day (at bedtime), As needed, Insomnia  menthol-benzocaine 3.6 mg-15 mg mucous membrane lozenge: 1 lozenge mucous membrane every 4 hours, As Needed  ocular lubricant ophthalmic solution: 1 drop(s) to each affected eye once a day, As needed, Dry Eyes  senna oral tablet: 2 tab(s) orally once a day (at bedtime)  traMADol 50 mg oral tablet: 0.5 tab(s) orally every 6 hours, As needed, Moderate Pain (4 - 6)  traMADol 50 mg oral tablet: 1 tab(s) orally every 6 hours, As needed, Severe Pain (7 - 10) 3 in 1 Commode, Dx: hip fracture (S72.002A):   acetaminophen 325 mg oral tablet: 2 tab(s) orally every 6 hours, As Needed, Mild pain  Aspirin Enteric Coated 325 mg oral delayed release tablet: 1 tab(s) orally 2 times a day MDD:2  To prevent blood clots (DVT) take for 30 days after surgery  calcium carbonate 500 mg (200 mg elemental calcium) oral tablet, chewable: 2 tab(s) orally 3 times a day, As needed, Heartburn  enalapril 20 mg oral tablet: 1 tab(s) orally once a day  guaiFENesin 100 mg/5 mL oral liquid: 5 milliliter(s) orally every 6 hours, As needed, Cough  Hospital bed, Dx: hip fracture (S72.002A):   insulin glargine: 12 unit(s) subcutaneous once a day (at bedtime)  insulin lispro (concentrated) 200 units/mL subcutaneous solution: 4 unit(s) subcutaneous 3 times a day with meals  insulin lispro (concentrated) 200 units/mL subcutaneous solution:  subcutaneous per standard sliding scale three times daily with meals  melatonin 3 mg oral tablet: 1 tab(s) orally once a day (at bedtime), As needed, Insomnia  menthol-benzocaine 3.6 mg-15 mg mucous membrane lozenge: 1 lozenge mucous membrane every 4 hours, As Needed  ocular lubricant ophthalmic solution: 1 drop(s) to each affected eye once a day, As needed, Dry Eyes  rolling walker, Dx: hip fracture (S79.912A) :   senna oral tablet: 2 tab(s) orally once a day (at bedtime)  traMADol 50 mg oral tablet: 0.5 tab(s) orally every 6 hours, As needed, Moderate Pain (4 - 6)  traMADol 50 mg oral tablet: 1 tab(s) orally every 6 hours, As needed, Severe Pain (7 - 10)

## 2020-03-26 NOTE — PROGRESS NOTE ADULT - ASSESSMENT
80 yo female with pmhx of DM, HTN, here for left hip fracture, s/p intramedullary nail/gamma nail of left hip with Dr. Santoyo today 3/26/2020.  Currently in PACU awaiting return to room.

## 2020-03-26 NOTE — DISCHARGE NOTE PROVIDER - NSDCCPTREATMENT_GEN_ALL_CORE_FT
PRINCIPAL PROCEDURE  Procedure: Intramedullary nailing of femur  Findings and Treatment: Instructions for Left Long Gamma Nail  1. Keep dressing clean, dry, and intact.  2. Full weight bearing as tolerated on affected extremity, with assistive devices (walker/cane) as needed.  3. Pain control: Take pain medications as prescribed and as needed.  4. Ice affected area as needed.  5. DVT Prophylaxis: Aspirin 325 BID vs Lovenox for 30 days. Montanasae see med rec for details.  6. Physical therapy  7. Follow up with Dr. Santoyo as outpatient in 10-14 days after discharge from the hospital or rehab. Please call office for appointment.  8. Remove staples/sutures Post-Op Day 14, and remove dressing Post-Op Day 10 with daily dressing changes as needed. PRINCIPAL PROCEDURE  Procedure: Intramedullary nailing of femur  Findings and Treatment: Instructions for Left Long Gamma Nail  1. Keep dressing clean, dry, and intact.  2. Full weight bearing as tolerated on affected extremity, with assistive devices (walker/cane) as needed.  3. Pain control: Take pain medications as prescribed and as needed.  4. Ice affected area as needed.  5. DVT Prophylaxis: Aspirin 325 BID. Plesae see med rec for details.  6. Physical therapy  7. Follow up with Dr. Santoyo as outpatient in 10-14 days after discharge from the hospital or rehab. Please call office for appointment.  8. No staples/sutures to be removed. Absorbable sutures were used for wound closure. May remove dressing Post-Op Day 10 with daily dressing changes as needed.

## 2020-03-26 NOTE — BRIEF OPERATIVE NOTE - NSICDXBRIEFPROCEDURE_GEN_ALL_CORE_FT
PROCEDURES:  Intramedullary nailing of femur 26-Mar-2020 12:35:18 Left long gamma nail Elliott Owens

## 2020-03-26 NOTE — CONSULT NOTE ADULT - ASSESSMENT
79 year old female with past medical history of HTN DM presenting from home after a mechanical fall in her yard resulting in L hip fracture.   Cardiology consulted for pre op risk assessment prior to intramedullary benjamin placement.      IN PROGRESS     EKG revealing NSR 81bpm  Chest xray with clear lungs    HTN  bp 157/71  Resume home enalapril     Dm  Management as per primary team     Monitor and replete electrolytes. Keep K>4.0 and Mg>2.0.  Kristy Azar FNP-C  Cardiology NP  [id number] [phone]

## 2020-03-26 NOTE — PROGRESS NOTE ADULT - SUBJECTIVE AND OBJECTIVE BOX
Patient seen and examined. Pain controlled. Tolerated procedure well.    Vital Signs Last 24 Hrs  T(C): 36.9 (03-26-20 @ 12:31), Max: 37.3 (03-26-20 @ 00:38)  T(F): 98.4 (03-26-20 @ 12:31), Max: 99.1 (03-26-20 @ 00:38)  HR: 83 (03-26-20 @ 12:41) (72 - 96)  BP: 169/51 (03-26-20 @ 12:41) (138/63 - 180/80)  BP(mean): --  RR: 18 (03-26-20 @ 12:41) (15 - 18)  SpO2: 98% (03-26-20 @ 12:41) (96% - 98%)    Physical Exam  Gen: NAD  LLE:   dsg c/d/i  motor/sensory grossly intact  warm well perfused  compartments soft    A/P: 79y Female w/ L IT Fx s/p POD 1  Pain control  DVT ppx- SCDs, lovenox tomorrow  PT/OT, WBAT  FU labs- AM  Dispo planning- home when medically stable. F/U w/ Dr Santoyo in 2 weeks. Call office for appt. Lovenox for 30 days for DVT ppx.  D/w attending Patient seen and examined. Pain controlled. Tolerated procedure well.    Vital Signs Last 24 Hrs  T(C): 36.9 (03-26-20 @ 12:31), Max: 37.3 (03-26-20 @ 00:38)  T(F): 98.4 (03-26-20 @ 12:31), Max: 99.1 (03-26-20 @ 00:38)  HR: 83 (03-26-20 @ 12:41) (72 - 96)  BP: 169/51 (03-26-20 @ 12:41) (138/63 - 180/80)  BP(mean): --  RR: 18 (03-26-20 @ 12:41) (15 - 18)  SpO2: 98% (03-26-20 @ 12:41) (96% - 98%)    Physical Exam  Gen: NAD  LLE:   dsg c/d/i  motor/sensory grossly intact  warm well perfused  compartments soft    A/P: 79y Female w/ L IT Fx s/p POD 1  Pain control  DVT ppx- SCDs, lovenox while in house starting tomorrow  PT/OT, WBAT  FU labs- AM  Dispo planning- home when medically stable. F/U w/ Dr Santoyo in 2 weeks. Call office for appt. Aspirin 325 BID for 30 days for DVT ppx on discharge.  D/w attending

## 2020-03-26 NOTE — CONSULT NOTE ADULT - SUBJECTIVE AND OBJECTIVE BOX
[facility] - Kirsten Church, Lisa, Rigo, Kim, Laila Baker  Office Number: [phone]    Initial Consult Note    CHIEF COMPLAINT: Patient is a 79y old  Female who presents with a chief complaint of Hip fracture (25 Mar 2020 16:04)      HPI:  79 year old female with past medical history of HTN DM presenting from home after a mechanical fall in her yard resulting in L hip fracture.  Patient states that today she was in her yard with her dog when she fell on her left side due to some sand bags being on the floor. She denies any head trauma, syncope or pre-syncopal symptoms. She tripped due to the sand bags being on the floor. She has L hip pain 10/10 in severity worse with movement, improved by laying still, pain is sharp. No other complaints. Denies headaches, nausea, vomiting, chest pain, SOB, palpitations, abdominal pain, constipation, diarrhea, melena, hematochezia, dysuria. She has never had any adverse reactions to anesthesia in the past.   In ED patient noted to have L hip fracture      PAST MEDICAL & SURGICAL HISTORY:  Benign essential HTN  DM (diabetes mellitus)  S/P cartilage surgery      SOCIAL HISTORY:  No tobacco, ethanol, or drug abuse.    FAMILY HISTORY:  No pertinent family history in first degree relatives    No family history of acute MI or sudden cardiac death.    MEDICATIONS  (STANDING):  influenza   Vaccine 0.5 milliLiter(s) IntraMuscular once    MEDICATIONS  (PRN):      Allergies    No Known Allergies    Intolerances        REVIEW OF SYSTEMS:    CONSTITUTIONAL: No weakness, fevers or chills  EYES/ENT: No visual changes;  No vertigo or throat pain   NECK: No pain or stiffness  RESPIRATORY: No cough, wheezing, hemoptysis; No shortness of breath  CARDIOVASCULAR: No chest pain or palpitations  GASTROINTESTINAL: No abdominal pain. No nausea, vomiting, or hematemesis; No diarrhea or constipation. No melena or hematochezia.  GENITOURINARY: No dysuria, frequency or hematuria  NEUROLOGICAL: No numbness or weakness  SKIN: No itching or rash  All other review of systems is negative unless indicated above    VITAL SIGNS:   Vital Signs Last 24 Hrs  T(C): 36.8 (26 Mar 2020 07:40), Max: 37.3 (26 Mar 2020 00:38)  T(F): 98.2 (26 Mar 2020 07:40), Max: 99.1 (26 Mar 2020 00:38)  HR: 81 (26 Mar 2020 07:40) (72 - 94)  BP: 157/71 (26 Mar 2020 07:40) (138/63 - 180/80)  BP(mean): --  RR: 16 (26 Mar 2020 07:40) (15 - 17)  SpO2: 97% (26 Mar 2020 07:40) (96% - 98%)    I&O's Summary    25 Mar 2020 07:01  -  26 Mar 2020 07:00  --------------------------------------------------------  IN: 525 mL / OUT: 0 mL / NET: 525 mL        On Exam:    Constitutional: NAD, alert and oriented x 3  Lungs:  Non-labored, breath sounds are clear bilaterally, No wheezing, rales or rhonchi  Cardiovascular: RRR.  S1 and S2 positive.  No murmurs, rubs, gallops or clicks  Gastrointestinal: Bowel Sounds present, soft, nontender.   Lymph: No peripheral edema. No cervical lymphadenopathy.  Neurological: Alert, no focal deficits  Skin: No rashes or ulcers   Psych:  Mood & affect appropriate.    LABS: All Labs Reviewed:                        12.1   11.15 )-----------( 204      ( 26 Mar 2020 05:14 )             36.2                         13.5   11.39 )-----------( 228      ( 25 Mar 2020 14:20 )             39.1     26 Mar 2020 05:14    139    |  106    |  20     ----------------------------<  213    4.3     |  27     |  0.59   25 Mar 2020 14:20    141    |  106    |  17     ----------------------------<  191    4.0     |  28     |  0.63     Ca    8.8        26 Mar 2020 05:14  Ca    9.3        25 Mar 2020 14:20    TPro  6.5    /  Alb  3.5    /  TBili  0.6    /  DBili  x      /  AST  13     /  ALT  18     /  AlkPhos  84     25 Mar 2020 14:20    PT/INR - ( 26 Mar 2020 05:14 )   PT: 13.3 sec;   INR: 1.18 ratio         PTT - ( 26 Mar 2020 05:14 )  PTT:37.7 sec      Blood Culture:         RADIOLOGY:    EKG:  < from: 12 Lead ECG (03.25.20 @ 14:04) >  Ventricular Rate 81 BPM    Atrial Rate 81 BPM    P-R Interval 128 ms    QRS Duration 74 ms    Q-T Interval 374 ms    QTC Calculation(Bezet) 434 ms    P Axis 72 degrees    R Axis 36 degrees    T Axis 41 degrees    Diagnosis Line Normal sinus rhythm  Normal ECG  No previous ECGs available    < end of copied text >  < from: Xray Femur 2 Views, Left (03.25.20 @ 14:42) >  Acute comminuted left intertrochanteric fracture. This finding is communicated with the emergency department via the PACS communication system.    No dislocation.    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    < end of copied text >

## 2020-03-26 NOTE — CHART NOTE - NSCHARTNOTEFT_GEN_A_CORE
Ortho Preop Note    Patient is a 79y old  Female who presents with a chief complaint of Hip fracture (25 Mar 2020 16:04)    Diagnosis: Left IT hip fx   Procedure: Left hip IMN  Surgeon: Dr. Santoyo                          12.1   11.15 )-----------( 204      ( 26 Mar 2020 05:14 )             36.2         139  |  106  |  20  ----------------------------<  213<H>  4.3   |  27  |  0.59    Ca    8.8      26 Mar 2020 05:14    TPro  6.5  /  Alb  3.5  /  TBili  0.6  /  DBili  x   /  AST  13<L>  /  ALT  18  /  AlkPhos  84      PT/INR - ( 26 Mar 2020 05:14 )   PT: 13.3 sec;   INR: 1.18 ratio         PTT - ( 26 Mar 2020 05:14 )  PTT:37.7 sec  Urinalysis Basic - ( 25 Mar 2020 21:03 )    Color: Yellow / Appearance: Slightly Turbid / S.015 / pH: x  Gluc: x / Ketone: Moderate  / Bili: Negative / Urobili: Negative   Blood: x / Protein: 15 / Nitrite: Negative   Leuk Esterase: Negative / RBC: 0-2 /HPF / WBC 3-5   Sq Epi: x / Non Sq Epi: Moderate / Bacteria: Moderate        [ ] Type & Screen: Completed  [ ] CBC: Completed  [ ] BMP: Completed  [ ] PT/PTT/INR: Completed  [ ] Urinalysis: Completed  [ ] Chest X-ray: Completed  [ ] EKG: Completed  [ ] NPO/IVF: Completed  [ ] Consent: To be completed by attending  [ ] Clearance: Completed  [ ] Added on to OR Schedule: Completed  [ ] Anti-coagulation held: Completed        Assessment & Plan:  79yFemale with Left IT hip fracture  -For OR 3/26

## 2020-03-26 NOTE — PROGRESS NOTE ADULT - PROBLEM SELECTOR PLAN 1
Pt taken to OR for intertrochanteric/gamma nail placement with Dr. Santoyo 3/26/2020  -Physical therapy consult  -Pain medication written  -Ancef 2000 mg x 2 more doses (total of three doses, first given preop)  -Lovenox 40mgQD to start tomorrow-  Tylenol 975 mg PO every 8 hours  Tramadol 25mg PO prn for mild pain  Oxycodone 2.5 mg PO prn moderate pain  Morphine 2mg Iv push for breakthrough pain.  Chris placement during procedure- watch I&O for voiding.   Advance diet as tolerated  IV fluids until tolerating adequate PO intake.   Ortho recs  CBC , BMP in am.  Keep dressing in place

## 2020-03-26 NOTE — PROGRESS NOTE ADULT - PROBLEM SELECTOR PLAN 3
On daily insulin at home- Novolog  Started on sliding scale  Hypoglycemia protocol ordered  Accuchecks pre meals and before bed  HGB A1C- 7.2(3/25/2020)  Diabetes education  Monitor blood glucose  Consistent carb with Dash/TLC diet.

## 2020-03-26 NOTE — DISCHARGE NOTE PROVIDER - HOSPITAL COURSE
This is a 79-yo female patient with PMhx of benign essential HTN and DM type II, who presented to the ED here on 3/25/2020 after sustaining a trip-and-fall injury (where she landed on her left side) in her backyard earlier that day.  Denied LOC, was unable to ambulate or WB on the left leg (was fully ambulatory without assistance prior to falling).  XR in ED showed a left hip IT fx.  She was admitted under the medical service, and Orthopaedics were consulted.  Patient was made NPO with IVF, put on bedrest, anticoagulation was held, and surgical treatment was planned with Dr. Santoyo.  Risks/benefits and alternatives to surgery were discussed, and patient had the opportunity to have all questions asked and answered.  Patient agreed to proceed with surgical ORIF/long IM nail of her left hip fracture, which was successfully performed on 3/26/2020.  Patient had essentially unremarkable pre-op and intra-op courses.  Post-operatively, patient became mildly anemic, and blood sugars This is a 79-yo female patient with PMhx of benign essential HTN and DM type II, who presented to the ED here on 3/25/2020 after sustaining a trip-and-fall injury (where she landed on her left side) in her backyard earlier that day.  Denied LOC, was unable to ambulate or WB on the left leg (was fully ambulatory without assistance prior to falling).  XR in ED showed a left hip IT fx.  She was admitted under the medical service, and Orthopaedics were consulted.  Patient was made NPO with IVF, put on bedrest, anticoagulation was held, and surgical treatment was planned with Dr. Santoyo.  Risks/benefits and alternatives to surgery were discussed, and patient had the opportunity to have all questions asked and answered.  Patient agreed to proceed with surgical ORIF/long IM nail of her left hip fracture, which was successfully performed on 3/26/2020.  Patient had essentially unremarkable pre-op and intra-op courses.  Post-operatively, patient became mildly anemic though remained asymptomatic - H&H monitored.  Patient also remained somewhat hyperglycemic despite sliding scale insulin coverage since admission - on POD #1, 2 units of Humalog TID AC ordered.  OOB/Ambulation with PT and walker encouraged and performed post-operatively.  (Plan for discharge to home vs. rehab pending PT eval of functional status.) This is a 79-yo female patient with PMhx of benign essential HTN and DM type II, who presented to the ED here on 3/25/2020 after sustaining a trip-and-fall injury (where she landed on her left side) in her backyard earlier that day.  Denied LOC, was unable to ambulate or WB on the left leg (was fully ambulatory without assistance prior to falling).  XR in ED showed a left hip IT fx.  She was admitted under the medical service, and Orthopaedics were consulted.  Patient was made NPO with IVF, put on bedrest, anticoagulation was held, and surgical treatment was planned with Dr. Santoyo.  Risks/benefits and alternatives to surgery were discussed, and patient had the opportunity to have all questions asked and answered.  Patient agreed to proceed with surgical ORIF/long IM nail of her left hip fracture, which was successfully performed on 3/26/2020.  Insulin regimen was escalated as necessary post op.  OOB/Ambulation with PT and walker encouraged and performed post-operatively.  Patient was followed by physical therapy and recommendation was for [facility]. [facility] arranged and patient will be transferred there.         Time spent on discharge by attending physician was 40 minutes

## 2020-03-26 NOTE — PRE-OP CHECKLIST - SURGICAL CONSENT
Telephone Encounter by Kamilah Parry NCMA at 03/17/17 11:29 AM     Author:  Kamilah Parry NCMA Service:  (none) Author Type:  Certified Medical Assistant     Filed:  03/17/17 11:29 AM Encounter Date:  3/16/2017 Status:  Signed     :  Kamilah Parry NCMA (Certified Medical Assistant)            Refilled x 90 days.  Please call pt and advise that he is due for his yearly physical and will need to be seen prior to next refill request.[HH1.1M]  Electronically Signed by:    MORRIS Elizabeth , 3/17/2017[HH1.1T]        Revision History        User Key Date/Time User Provider Type Action    > HH1.1 03/17/17 11:29 AM Kamilah Parry NCMA Certified Medical Assistant Sign    M - Manual, T - Template             done

## 2020-03-26 NOTE — PROGRESS NOTE ADULT - ATTENDING COMMENTS
Patient seen and examined by attending physician. Agree with documentation above, edited where necessary.

## 2020-03-27 DIAGNOSIS — D50.0 IRON DEFICIENCY ANEMIA SECONDARY TO BLOOD LOSS (CHRONIC): ICD-10-CM

## 2020-03-27 LAB
ANION GAP SERPL CALC-SCNC: 4 MMOL/L — LOW (ref 5–17)
BASOPHILS # BLD AUTO: 0.01 K/UL — SIGNIFICANT CHANGE UP (ref 0–0.2)
BASOPHILS NFR BLD AUTO: 0.1 % — SIGNIFICANT CHANGE UP (ref 0–2)
BUN SERPL-MCNC: 15 MG/DL — SIGNIFICANT CHANGE UP (ref 7–23)
CALCIUM SERPL-MCNC: 8.7 MG/DL — SIGNIFICANT CHANGE UP (ref 8.5–10.1)
CHLORIDE SERPL-SCNC: 109 MMOL/L — HIGH (ref 96–108)
CO2 SERPL-SCNC: 28 MMOL/L — SIGNIFICANT CHANGE UP (ref 22–31)
CREAT SERPL-MCNC: 0.57 MG/DL — SIGNIFICANT CHANGE UP (ref 0.5–1.3)
EOSINOPHIL # BLD AUTO: 0.02 K/UL — SIGNIFICANT CHANGE UP (ref 0–0.5)
EOSINOPHIL NFR BLD AUTO: 0.2 % — SIGNIFICANT CHANGE UP (ref 0–6)
GLUCOSE SERPL-MCNC: 228 MG/DL — HIGH (ref 70–99)
HCT VFR BLD CALC: 28.7 % — LOW (ref 34.5–45)
HCT VFR BLD CALC: 30.8 % — LOW (ref 34.5–45)
HGB BLD-MCNC: 10.3 G/DL — LOW (ref 11.5–15.5)
HGB BLD-MCNC: 9.8 G/DL — LOW (ref 11.5–15.5)
IMM GRANULOCYTES NFR BLD AUTO: 0.3 % — SIGNIFICANT CHANGE UP (ref 0–1.5)
LYMPHOCYTES # BLD AUTO: 0.92 K/UL — LOW (ref 1–3.3)
LYMPHOCYTES # BLD AUTO: 9.2 % — LOW (ref 13–44)
MCHC RBC-ENTMCNC: 30.4 PG — SIGNIFICANT CHANGE UP (ref 27–34)
MCHC RBC-ENTMCNC: 34.1 GM/DL — SIGNIFICANT CHANGE UP (ref 32–36)
MCV RBC AUTO: 89.1 FL — SIGNIFICANT CHANGE UP (ref 80–100)
MONOCYTES # BLD AUTO: 1.48 K/UL — HIGH (ref 0–0.9)
MONOCYTES NFR BLD AUTO: 14.8 % — HIGH (ref 2–14)
NEUTROPHILS # BLD AUTO: 7.55 K/UL — HIGH (ref 1.8–7.4)
NEUTROPHILS NFR BLD AUTO: 75.4 % — SIGNIFICANT CHANGE UP (ref 43–77)
NRBC # BLD: 0 /100 WBCS — SIGNIFICANT CHANGE UP (ref 0–0)
PLATELET # BLD AUTO: 173 K/UL — SIGNIFICANT CHANGE UP (ref 150–400)
POTASSIUM SERPL-MCNC: 4.2 MMOL/L — SIGNIFICANT CHANGE UP (ref 3.5–5.3)
POTASSIUM SERPL-SCNC: 4.2 MMOL/L — SIGNIFICANT CHANGE UP (ref 3.5–5.3)
RBC # BLD: 3.22 M/UL — LOW (ref 3.8–5.2)
RBC # FLD: 13 % — SIGNIFICANT CHANGE UP (ref 10.3–14.5)
SODIUM SERPL-SCNC: 141 MMOL/L — SIGNIFICANT CHANGE UP (ref 135–145)
WBC # BLD: 10.01 K/UL — SIGNIFICANT CHANGE UP (ref 3.8–10.5)
WBC # FLD AUTO: 10.01 K/UL — SIGNIFICANT CHANGE UP (ref 3.8–10.5)

## 2020-03-27 PROCEDURE — 99232 SBSQ HOSP IP/OBS MODERATE 35: CPT

## 2020-03-27 RX ORDER — CALCIUM CARBONATE 500(1250)
2 TABLET ORAL ONCE
Refills: 0 | Status: COMPLETED | OUTPATIENT
Start: 2020-03-27 | End: 2020-03-27

## 2020-03-27 RX ORDER — INSULIN LISPRO 100/ML
2 VIAL (ML) SUBCUTANEOUS
Refills: 0 | Status: DISCONTINUED | OUTPATIENT
Start: 2020-03-27 | End: 2020-03-28

## 2020-03-27 RX ORDER — BENZOCAINE AND MENTHOL 5; 1 G/100ML; G/100ML
1 LIQUID ORAL EVERY 4 HOURS
Refills: 0 | Status: DISCONTINUED | OUTPATIENT
Start: 2020-03-27 | End: 2020-03-31

## 2020-03-27 RX ORDER — CALCIUM CARBONATE 500(1250)
21 TABLET ORAL ONCE
Refills: 0 | Status: DISCONTINUED | OUTPATIENT
Start: 2020-03-27 | End: 2020-03-27

## 2020-03-27 RX ADMIN — Medication 100 MILLIGRAM(S): at 12:21

## 2020-03-27 RX ADMIN — Medication 2 UNIT(S): at 17:49

## 2020-03-27 RX ADMIN — Medication 975 MILLIGRAM(S): at 22:18

## 2020-03-27 RX ADMIN — Medication 100 MILLIGRAM(S): at 06:42

## 2020-03-27 RX ADMIN — Medication 8: at 12:19

## 2020-03-27 RX ADMIN — ENOXAPARIN SODIUM 40 MILLIGRAM(S): 100 INJECTION SUBCUTANEOUS at 06:42

## 2020-03-27 RX ADMIN — Medication 2 TABLET(S): at 23:27

## 2020-03-27 RX ADMIN — Medication 20 MILLIGRAM(S): at 06:42

## 2020-03-27 RX ADMIN — OXYCODONE HYDROCHLORIDE 5 MILLIGRAM(S): 5 TABLET ORAL at 10:38

## 2020-03-27 RX ADMIN — Medication 4: at 08:31

## 2020-03-27 RX ADMIN — OXYCODONE HYDROCHLORIDE 5 MILLIGRAM(S): 5 TABLET ORAL at 11:30

## 2020-03-27 RX ADMIN — Medication 8: at 17:49

## 2020-03-27 RX ADMIN — Medication 2 UNIT(S): at 12:20

## 2020-03-27 RX ADMIN — Medication 975 MILLIGRAM(S): at 15:49

## 2020-03-27 RX ADMIN — Medication 975 MILLIGRAM(S): at 14:45

## 2020-03-27 RX ADMIN — Medication 1: at 22:23

## 2020-03-27 RX ADMIN — Medication 975 MILLIGRAM(S): at 06:44

## 2020-03-27 RX ADMIN — SODIUM CHLORIDE 75 MILLILITER(S): 9 INJECTION, SOLUTION INTRAVENOUS at 06:44

## 2020-03-27 RX ADMIN — SENNA PLUS 2 TABLET(S): 8.6 TABLET ORAL at 22:18

## 2020-03-27 NOTE — PROGRESS NOTE ADULT - SUBJECTIVE AND OBJECTIVE BOX
The patient was evaluated  79y Female    T(C): 36.8 (03-27-20 @ 08:50), Max: 37.3 (03-27-20 @ 04:43)  HR: 97 (03-27-20 @ 08:50) (66 - 98)  BP: 146/61 (03-27-20 @ 08:50) (120/56 - 179/68)  RR: 18 (03-27-20 @ 08:50) (10 - 18)  SpO2: 95% (03-27-20 @ 08:50) (92% - 100%)  Wt(kg): --    Pt seen, doing well, no anesthesia complications or complaints noted or reported.   No Nausea    No additional recommendations.     Pain well controlled

## 2020-03-27 NOTE — PROGRESS NOTE ADULT - SUBJECTIVE AND OBJECTIVE BOX
[facility] -- Kirsten Church, Lisa, Rigo, Samuel Alvarez Savella  Office # [phone]      Follow Up:  preop/postop evaluation    Subjective/Observations: Seen and examined.  Resting comfortably lying flat with no reports of cp, sob or palpitations.  No signs of orthopnea or PND.  NAD.       REVIEW OF SYSTEMS: All other review of systems is negative unless indicated above    PAST MEDICAL & SURGICAL HISTORY:  Benign essential HTN  DM (diabetes mellitus)  S/P cartilage surgery      MEDICATIONS  (STANDING):  acetaminophen   Tablet .. 975 milliGRAM(s) Oral every 8 hours  dextrose 5%. 1000 milliLiter(s) (50 mL/Hr) IV Continuous <Continuous>  dextrose 50% Injectable 25 Gram(s) IV Push once  enalapril 20 milliGRAM(s) Oral daily  enoxaparin Injectable 40 milliGRAM(s) SubCutaneous every 24 hours  influenza   Vaccine 0.5 milliLiter(s) IntraMuscular once  insulin lispro (HumaLOG) corrective regimen sliding scale   SubCutaneous three times a day before meals  insulin lispro (HumaLOG) corrective regimen sliding scale   SubCutaneous at bedtime  insulin lispro Injectable (HumaLOG) 2 Unit(s) SubCutaneous three times a day before meals  lactated ringers. 1000 milliLiter(s) (75 mL/Hr) IV Continuous <Continuous>  senna 2 Tablet(s) Oral at bedtime    MEDICATIONS  (PRN):  artificial  tears Solution 1 Drop(s) Both EYES daily PRN Dry Eyes  benzocaine 15 mG/menthol 3.6 mG (Sugar-Free) Lozenge 1 Lozenge Oral every 4 hours PRN Sore Throat  guaiFENesin   Syrup  (Sugar-Free) 100 milliGRAM(s) Oral every 6 hours PRN Cough  melatonin 3 milliGRAM(s) Oral at bedtime PRN Insomnia  morphine  - Injectable 2 milliGRAM(s) IV Push every 3 hours PRN breakthrough pain  ondansetron Injectable 4 milliGRAM(s) IV Push every 6 hours PRN Nausea and/or Vomiting  oxyCODONE    IR 2.5 milliGRAM(s) Oral every 4 hours PRN Moderate Pain (4 - 6)  oxyCODONE    IR 5 milliGRAM(s) Oral every 4 hours PRN Severe Pain (7 - 10)  traMADol 25 milliGRAM(s) Oral every 4 hours PRN Mild Pain (1 - 3)      Allergies    No Known Allergies    Intolerances            Vital Signs Last 24 Hrs  T(C): 36.8 (27 Mar 2020 08:50), Max: 37.3 (27 Mar 2020 04:43)  T(F): 98.2 (27 Mar 2020 08:50), Max: 99.1 (27 Mar 2020 04:43)  HR: 97 (27 Mar 2020 08:50) (66 - 98)  BP: 146/61 (27 Mar 2020 08:50) (120/56 - 179/68)  BP(mean): --  RR: 18 (27 Mar 2020 08:50) (10 - 18)  SpO2: 95% (27 Mar 2020 08:50) (92% - 100%)    I&O's Summary    26 Mar 2020 07:01  -  27 Mar 2020 07:00  --------------------------------------------------------  IN: 370 mL / OUT: 1000 mL / NET: -630 mL          PHYSICAL EXAM:  TELE: Not on tel  Constitutional: NAD, awake and alert, well-developed  HEENT: Moist Mucous Membranes, Anicteric  Pulmonary: Non-labored, breath sounds are clear anteriorly and decreased on Rt base > left base, No wheezing, rales or rhonchi  Cardiovascular: Regular, S1 and S2, No murmurs, rubs, gallops or clicks  Gastrointestinal: Bowel Sounds present, soft, nontender.   Lymph: +edema left lateral thigh at incision site. No peripheral edema. No lymphadenopathy.  Skin: No visible rashes or ulcers.  DSD on left lateral thigh.   Psych:  Mood & affect appropriate    LABS: All Labs Reviewed:                        9.8    10.01 )-----------( 173      ( 27 Mar 2020 07:14 )             28.7                         12.1   11.15 )-----------( 204      ( 26 Mar 2020 05:14 )             36.2                         13.5   11.39 )-----------( 228      ( 25 Mar 2020 14:20 )             39.1     27 Mar 2020 07:14    141    |  109    |  15     ----------------------------<  228    4.2     |  28     |  0.57   26 Mar 2020 05:14    139    |  106    |  20     ----------------------------<  213    4.3     |  27     |  0.59   25 Mar 2020 14:20    141    |  106    |  17     ----------------------------<  191    4.0     |  28     |  0.63     Ca    8.7        27 Mar 2020 07:14  Ca    8.8        26 Mar 2020 05:14  Ca    9.3        25 Mar 2020 14:20    TPro  6.5    /  Alb  3.5    /  TBili  0.6    /  DBili  x      /  AST  13     /  ALT  18     /  AlkPhos  84     25 Mar 2020 14:20    PT/INR - ( 26 Mar 2020 05:14 )   PT: 13.3 sec;   INR: 1.18 ratio      < from: 12 Lead ECG (03.25.20 @ 14:04) >  Ventricular Rate 81 BPM    Atrial Rate 81 BPM    P-R Interval 128 ms    QRS Duration 74 ms    Q-T Interval 374 ms    QTC Calculation(Bezet) 434 ms    P Axis 72 degrees    R Axis 36 degrees    T Axis 41 degrees    Diagnosis Line Normal sinus rhythm  Normal ECG  No previous ECGs available  Confirmed by Sylvia Gonzalez MD ([id number]) on 3/25/2020 5:24:57 PM    < end of copied text >    < from: Xray Lumbar Spine AP + Lateral (03.25.20 @ 14:43) >  EXAM:  LUMBAR SPINE AP & LAT                            PROCEDURE DATE:  03/25/2020          INTERPRETATION:  Clinical information: Status post fall. Pain.    TECHNIQUE: AP, crosstable lateral, and coned-down crosstable lateral views of the lumbosacral spine.    COMPARISON: None available.    FINDINGS: No acute fractures or dislocations are noted. There is a moderate leftward convex curvature to the upper lumbar spine. There is no loss of vertebral body height. No aggressive osteolytic blastic or osteolytic lesions are notable. Scattered degenerative lucencies and areas of sclerosis are notable throughout the vertebral bodies and facets. Multilevel facet arthropathy is notable. Multilevel degenerative disc disease is notable. Bilateral SI joint arthrosis is noted.    IMPRESSION: No acute fractures or dislocations.    Multilevel lumbar spondylosis.                GIO GUALLPA M.D., ATTENDING RADIOLOGIST  This document has been electronically signed. Mar 25 2020  2:52PM                < end of copied text >  < from: Xray Femur 2 Views, Left (03.25.20 @ 14:42) >  EXAM:  XR FEMUR 2 VIEWS LT                          EXAM:  XR HIP WITH PELV 2-3V LT                            PROCEDURE DATE:  03/25/2020          INTERPRETATION:  X-rays of the pelvis, left hip and left femur    Indication: The patient fell.    AP view of the pelvis, 2 views of the left hip and 3 views of the left femur are acquired without a previous examination for comparison.    Impression: Acute comminuted left intertrochanteric fracture. This finding is communicated with the emergency department via the PACS communication system.    No dislocation.    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                KAYKAY TINOCO M.D., ATTENDING RADIOLOGIST  This document has been electronically signed. Mar 25 2020  2:51PM                < end of copied text >  < from: Xray Chest 1 View AP/PA (03.25.20 @ 14:42) >    EXAM:  XR CHEST AP OR PA 1V                            PROCEDURE DATE:  03/25/2020          INTERPRETATION:  INDICATION:Patient fell. Rule out injury.    COMPARISON: None available    FINDINGS:    Heart: The heart size is normal.    Mediastinum: Mediastinal contours are normal. There are no enlarged mediastinal or hilar nodes.    Lungs: The lungs are clear. Small and increased opacity seen in the right upper lung field overlying the right posterior sixth rib    Osseous structures: The osseous structures are intact. No sign of acute fracture. Shallow dextroscoliosis of the lower thoracic spine.    Soft tissues:There are no soft tissue abnormalities    Pleura: There are no pleural effusions.    IMPRESSION:    Small opacity in the right upper lung field, nonspecific.    No sign of rib fracture                SYLVIA DELA CRUZ M.D.; ATTENDING RADIOLOGIST  This document has been electronically signed. Mar 25 2020  2:54PM                < end of copied text >       PTT - ( 26 Mar 2020 05:14 )  PTT:37.7 sec

## 2020-03-27 NOTE — PROGRESS NOTE ADULT - PROBLEM SELECTOR PLAN 1
Pt taken to OR for intertrochanteric/gamma nail placement with Dr. Santoyo 3/26/2020  -Continue PT/OOB/Ambulation as tolerated  -Pain medication written  -Ancef completed  -Lovenox 40mgQD to start tomorrow-  Tylenol 975 mg PO every 8 hours  Tramadol 25mg PO prn for mild pain  Oxycodone 2.5 mg PO prn moderate pain  Morphine 2mg Iv push for breakthrough pain.  Chris d/c'd  Advance diet as tolerated  IV fluids until tolerating adequate PO intake.   Ortho recs  Keep dressing in place  Will monitor labs Pt taken to OR for intertrochanteric/gamma nail placement with Dr. Santoyo 3/26/2020  -Continue PT/OOB/Ambulation as tolerated  -Pain medication written  -Ancef completed  -Lovenox today for DVT PPX  -Chris d/c'd  -Advance diet as tolerated  -IV fluids until tolerating adequate PO intake.   -Ortho recs  -Keep dressing in place  -Will monitor labs  -D/C planning

## 2020-03-27 NOTE — PHYSICAL THERAPY INITIAL EVALUATION ADULT - ADDITIONAL COMMENTS
Patient lives in a 2 level house with 5 steps with handrail to enter and 13 steps with handrail to access bedroom and bathroom. Patient reports there is no bathroom on main floor of house. Pt has a bathtub with curtain. Pt does not own any DME. Pt reports that her spouse can assist her with lower body dressing upon d/c home. Pt drives a car. Pt reports that she can "get a bed and stay on the main floor." Patient requires assistance with ADL's and transfers due to decreased ROM/strength Left LE, decreased endurance and impaired sitting/standing balance. Daughter (has MS) lives in basement and has her own HHA. 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

## 2020-03-27 NOTE — PROGRESS NOTE ADULT - ASSESSMENT
79 year old female with past medical history of HTN DM presenting from home after a mechanical fall in her yard resulting in L hip fracture.   Cardiology consulted for pre op risk assessment prior to intramedullary benjamin placement.  EKG revealing NSR 81bpm  Chest xray with small opacity in right upper lung field, non-specific    s/p POD 2 Left IMN  - followed by ortho  - pain control  - I/S 10 x q1h    HTN  - controlled as pr flow sheet, continue routine hemodynamic monitoring   - Cont Enalapril 20mg daily     DM  Management as per primary team     Acute blood loss anemia  - cont to monitor Hgb - on admission 13.5 - today 9.8  - transfuse as per primary team  - maintain hgb > 9    DVT PPX  - cont Lovenox as per primary team    Monitor and replete electrolytes. Keep K>4.0 and Mg>2.0.  All other medical management as per primary team  Further Cardiac workup as per clinical course.  Will follow    ELLI RdzCNP-BC  Cardiology   [facility] [phone]

## 2020-03-27 NOTE — PROGRESS NOTE ADULT - NSHPATTENDINGPLANDISCUSS_GEN_ALL_CORE
Dr. Norris patient and daughter via telephone, re: PT eval, ambulation, monitoring blood levels, anticipated hospital course, discharge planning

## 2020-03-27 NOTE — PHYSICAL THERAPY INITIAL EVALUATION ADULT - PERTINENT HX OF CURRENT PROBLEM, REHAB EVAL
Pt is a 80 yo F presenting from home after a mechanical fall in her yard resulting in L hip fracture. PMH DM2 and pinched nerve, HTN.

## 2020-03-27 NOTE — PROGRESS NOTE ADULT - ASSESSMENT
80 yo female with pmhx of DM, HTN, here for left hip fracture, s/p long intramedullary nail/gamma nail of left hip with Dr. Santoyo 3/26/2020.

## 2020-03-27 NOTE — PROGRESS NOTE ADULT - PROBLEM SELECTOR PLAN 4
H&H decreased, though asymptomatic at this time  Will re-evaluate patient after OOB/WB/exertion  Will re-check H&H at 1400 hours today

## 2020-03-27 NOTE — PROGRESS NOTE ADULT - PROBLEM SELECTOR PLAN 3
On daily insulin at home- Novolog  Started on sliding scale  Added 2 units TID lantus  Hypoglycemia protocol ordered  Accuchecks pre meals and before bed  HGB A1C- 7.2(3/25/2020)  Diabetes education  Monitor blood glucose  Consistent carb with Dash/TLC diet. On daily insulin at home- Novolog  Started on sliding scale  Added 2 units Humalog TID. Monitor BG today, and if remains high, will add Lantus at bedtime.  Hypoglycemia protocol ordered  Accuchecks pre meals and before bed  HGB A1C- 7.2(3/25/2020)  Diabetes education  Monitor blood glucose  Consistent carb with Dash/TLC diet.

## 2020-03-27 NOTE — OCCUPATIONAL THERAPY INITIAL EVALUATION ADULT - PERTINENT HX OF CURRENT PROBLEM, REHAB EVAL
78 y/o female s/p IM nailing (long Gamma Nail) left hip due to intertrochanteric fx on 3/26/2020 s/p fall at home on 3/25/2020.

## 2020-03-27 NOTE — PROGRESS NOTE ADULT - SUBJECTIVE AND OBJECTIVE BOX
Patient seen and examined. Pain controlled. No acute events overnight    Vital Signs Last 24 Hrs  T(C): 36.8 (03-27-20 @ 08:50), Max: 37.3 (03-27-20 @ 04:43)  T(F): 98.2 (03-27-20 @ 08:50), Max: 99.1 (03-27-20 @ 04:43)  HR: 97 (03-27-20 @ 08:50) (66 - 98)  BP: 146/61 (03-27-20 @ 08:50) (120/56 - 179/68)  BP(mean): --  RR: 18 (03-27-20 @ 08:50) (10 - 18)  SpO2: 95% (03-27-20 @ 08:50) (92% - 100%)    Physical Exam  Gen: NAD  LLE:   Dressing c/d/i  +EHL/FHL/Gsc/TA  SILT L3-S1  DP +  Compartments soft  No calf ttp    A/P: 79y Female sp L IMN POD 2  Pain control  DVT ppx  PT/OT, WBAT  FU labs  Dispo planning  D/w attending Patient seen and examined. Pain controlled. No acute events overnight    Vital Signs Last 24 Hrs  T(C): 36.8 (03-27-20 @ 08:50), Max: 37.3 (03-27-20 @ 04:43)  T(F): 98.2 (03-27-20 @ 08:50), Max: 99.1 (03-27-20 @ 04:43)  HR: 97 (03-27-20 @ 08:50) (66 - 98)  BP: 146/61 (03-27-20 @ 08:50) (120/56 - 179/68)  BP(mean): --  RR: 18 (03-27-20 @ 08:50) (10 - 18)  SpO2: 95% (03-27-20 @ 08:50) (92% - 100%)    Physical Exam  Gen: NAD  LLE:   Dressing c/d/i  +EHL/FHL/Gsc/TA  SILT L3-S1  DP +  Compartments soft  No calf ttp    A/P: 79y Female sp L IMN POD 1  Pain control  DVT ppx  PT/OT, WBAT  FU labs  Dispo planning  D/w attending

## 2020-03-27 NOTE — PROGRESS NOTE ADULT - SUBJECTIVE AND OBJECTIVE BOX
Surgery PA note, on behalf of Internal Medicine/Hospitalist, Dr. Norris:      From intake H&P:  Pt is a 80 yo F presenting from home after a mechanical fall in her yard resulting in L hip fracture. PMH DM2 and pinched nerve, HTN.   Patient seen and examined at bedside. Patient states that today she was in her yard today with her dog when she fell on her left side due to some sand bags being on the floor. She denies any head trauma, syncope or pre-syncopal symptoms. She tripped due to the sand bags being on the floor. She has L hip pain 10/10 in severity worse with movement, improved by laying still, pain is sharp. No other complaints. Denies headaches, nausea, vomiting, chest pain, SOB, palpitations, abdominal pain, constipation, diarrhea, melena, hematochezia, dysuria. She has never had any adverse reactions to anesthesia in the past. She denies past hx of TIA/MI/CVA.   In ED patient noted to have L hip fracture. (25 Mar 2020 16:00)    Subjective - Today, 3/27/20:   Patient seen and examined at bedside.  No overnight events.  Patient lying comfortably in bed, reports left hip post-op soreness that worsens with motion, though the pain from the initial fracture has considerably improved after surgery.  Has not ambulated or WB since surgery.  Tolerating diet, denies N/V.  Denies CP/SOB/KEBEDE/palpitations, dizziness/lightheadedness.        REVIEW OF SYSTEMS:  CONSTITUTIONAL: Denies f/c, lying comfortably  EYES/ENT: mild throat dryness/discomfort  RESPIRATORY: No cough, wheezing, hemoptysis; No shortness of breath  CARDIOVASCULAR: No chest pain or palpitations  GASTROINTESTINAL: No abdominal, nausea, vomiting, or hematemesis; No diarrhea or constipation. No melena or hematochezia.  GENITOURINARY: Chris d/c'd  NEUROLOGICAL: No dizziness, numbness, or weakness  SKIN: No itching, burning, rashes, or lesions   All other review of systems is negative unless indicated above.      Medications:  acetaminophen   Tablet .. 975 milliGRAM(s) Oral every 8 hours  artificial  tears Solution 1 Drop(s) Both EYES daily PRN  benzocaine 15 mG/menthol 3.6 mG (Sugar-Free) Lozenge 1 Lozenge Oral every 4 hours PRN  dextrose 5%. 1000 milliLiter(s) IV Continuous <Continuous>  dextrose 50% Injectable 25 Gram(s) IV Push once  enalapril 20 milliGRAM(s) Oral daily  enoxaparin Injectable 40 milliGRAM(s) SubCutaneous every 24 hours  guaiFENesin   Syrup  (Sugar-Free) 100 milliGRAM(s) Oral every 6 hours PRN  influenza   Vaccine 0.5 milliLiter(s) IntraMuscular once  insulin lispro (HumaLOG) corrective regimen sliding scale   SubCutaneous three times a day before meals  insulin lispro (HumaLOG) corrective regimen sliding scale   SubCutaneous at bedtime  insulin lispro Injectable (HumaLOG) 2 Unit(s) SubCutaneous three times a day before meals  lactated ringers. 1000 milliLiter(s) IV Continuous <Continuous>  melatonin 3 milliGRAM(s) Oral at bedtime PRN  morphine  - Injectable 2 milliGRAM(s) IV Push every 3 hours PRN  ondansetron Injectable 4 milliGRAM(s) IV Push every 6 hours PRN  oxyCODONE    IR 2.5 milliGRAM(s) Oral every 4 hours PRN  oxyCODONE    IR 5 milliGRAM(s) Oral every 4 hours PRN  senna 2 Tablet(s) Oral at bedtime  traMADol 25 milliGRAM(s) Oral every 4 hours PRN      O:  Vital Signs Last 24 Hrs  T(C): 36.8 (27 Mar 2020 08:50), Max: 37.3 (27 Mar 2020 04:43)  T(F): 98.2 (27 Mar 2020 08:50), Max: 99.1 (27 Mar 2020 04:43)  HR: 97 (27 Mar 2020 08:50) (66 - 98)  BP: 146/61 (27 Mar 2020 08:50) (120/56 - 179/68)  BP(mean): --  RR: 18 (27 Mar 2020 08:50) (10 - 18)  SpO2: 95% (27 Mar 2020 08:50) (92% - 100%)  03-26-20 @ 07:01  -  03-27-20 @ 07:00  --------------------------------------------------------  IN: 370 mL / OUT: 1000 mL / NET: -630 mL        PHYSICAL EXAM  GENERAL: no acute distress  HEENT: NC/AT, EOMI, neck supple  RESPIRATORY: CTA bilat w/o W/R/R  CARDIOVASCULAR: RRR, no murmurs, gallops, rubs  ABDOMINAL: soft, non-tender, non-distended, positive bowel sounds   EXTREMITIES: SCD in place.  Distal pulses intact.  Good sensation b/l.  Able to move her toes. Calves soft/NT bilat LE's.   NEUROLOGICAL: alert and oriented x 3, non-focal  SKIN: no rashes or lesions   MUSCULOSKELETAL: Aquacel dressing intact, some mukund-incisional STS.  No appreciable ecchymosis.                   9.8    10.01 )-----------( 173      ( 27 Mar 2020 07:14 )             28.7     03-27    141  |  109<H>  |  15  ----------------------------<  228<H>  4.2   |  28  |  0.57    Ca    8.7      27 Mar 2020 07:14    TPro  6.5  /  Alb  3.5  /  TBili  0.6  /  DBili  x   /  AST  13<L>  /  ALT  18  /  AlkPhos  84  03-25    PT/INR - ( 26 Mar 2020 05:14 )   PT: 13.3 sec;   INR: 1.18 ratio         PTT - ( 26 Mar 2020 05:14 )  PTT:37.7 sec    03-26-20 @ 07:01  -  03-27-20 @ 07:00  --------------------------------------------------------  IN: 370 mL / OUT: 1000 mL / NET: -630 mL Surgery PA note, on behalf of Hospitalist, Dr. Norris:      From intake H&P:  Pt is a 80 yo F presenting from home after a mechanical fall in her yard resulting in L hip fracture. PMH DM2 and pinched nerve, HTN.   Patient seen and examined at bedside. Patient states that today she was in her yard today with her dog when she fell on her left side due to some sand bags being on the floor. She denies any head trauma, syncope or pre-syncopal symptoms. She tripped due to the sand bags being on the floor. She has L hip pain 10/10 in severity worse with movement, improved by laying still, pain is sharp. No other complaints. Denies headaches, nausea, vomiting, chest pain, SOB, palpitations, abdominal pain, constipation, diarrhea, melena, hematochezia, dysuria. She has never had any adverse reactions to anesthesia in the past. She denies past hx of TIA/MI/CVA.   In ED patient noted to have L hip fracture. (25 Mar 2020 16:00)    Subjective - Today, 3/27/20:   Patient seen and examined at bedside.  No overnight events.  Patient lying comfortably in bed, reports left hip post-op soreness that worsens with motion, though the pain from the initial fracture has considerably improved after surgery.  Has not ambulated or WB since surgery.  Tolerating diet, denies N/V.  Denies CP/SOB/KEBEDE/palpitations, dizziness/lightheadedness.        REVIEW OF SYSTEMS:  CONSTITUTIONAL: Denies f/c, lying comfortably  EYES/ENT: mild throat dryness/discomfort  RESPIRATORY: No cough, wheezing, hemoptysis; No shortness of breath  CARDIOVASCULAR: No chest pain or palpitations  GASTROINTESTINAL: No abdominal, nausea, vomiting, or hematemesis; No diarrhea or constipation. No melena or hematochezia.  GENITOURINARY: Chris d/c'd  NEUROLOGICAL: No dizziness, numbness, or weakness  SKIN: No itching, burning, rashes, or lesions   All other review of systems is negative unless indicated above.      Medications:  acetaminophen   Tablet .. 975 milliGRAM(s) Oral every 8 hours  artificial  tears Solution 1 Drop(s) Both EYES daily PRN  benzocaine 15 mG/menthol 3.6 mG (Sugar-Free) Lozenge 1 Lozenge Oral every 4 hours PRN  dextrose 5%. 1000 milliLiter(s) IV Continuous <Continuous>  dextrose 50% Injectable 25 Gram(s) IV Push once  enalapril 20 milliGRAM(s) Oral daily  enoxaparin Injectable 40 milliGRAM(s) SubCutaneous every 24 hours  guaiFENesin   Syrup  (Sugar-Free) 100 milliGRAM(s) Oral every 6 hours PRN  influenza   Vaccine 0.5 milliLiter(s) IntraMuscular once  insulin lispro (HumaLOG) corrective regimen sliding scale   SubCutaneous three times a day before meals  insulin lispro (HumaLOG) corrective regimen sliding scale   SubCutaneous at bedtime  insulin lispro Injectable (HumaLOG) 2 Unit(s) SubCutaneous three times a day before meals  lactated ringers. 1000 milliLiter(s) IV Continuous <Continuous>  melatonin 3 milliGRAM(s) Oral at bedtime PRN  morphine  - Injectable 2 milliGRAM(s) IV Push every 3 hours PRN  ondansetron Injectable 4 milliGRAM(s) IV Push every 6 hours PRN  oxyCODONE    IR 2.5 milliGRAM(s) Oral every 4 hours PRN  oxyCODONE    IR 5 milliGRAM(s) Oral every 4 hours PRN  senna 2 Tablet(s) Oral at bedtime  traMADol 25 milliGRAM(s) Oral every 4 hours PRN      O:  Vital Signs Last 24 Hrs  T(C): 36.8 (27 Mar 2020 08:50), Max: 37.3 (27 Mar 2020 04:43)  T(F): 98.2 (27 Mar 2020 08:50), Max: 99.1 (27 Mar 2020 04:43)  HR: 97 (27 Mar 2020 08:50) (66 - 98)  BP: 146/61 (27 Mar 2020 08:50) (120/56 - 179/68)  BP(mean): --  RR: 18 (27 Mar 2020 08:50) (10 - 18)  SpO2: 95% (27 Mar 2020 08:50) (92% - 100%)  03-26-20 @ 07:01  -  03-27-20 @ 07:00  --------------------------------------------------------  IN: 370 mL / OUT: 1000 mL / NET: -630 mL        PHYSICAL EXAM  GENERAL: no acute distress  HEENT: NC/AT, EOMI, neck supple  RESPIRATORY: CTA bilat w/o W/R/R  CARDIOVASCULAR: RRR, no murmurs, gallops, rubs  ABDOMINAL: soft, non-tender, non-distended, positive bowel sounds   EXTREMITIES: SCD in place.  Distal pulses intact.  Good sensation b/l.  Able to move her toes. Calves soft/NT bilat LE's.   NEUROLOGICAL: alert and oriented x 3, non-focal  SKIN: no rashes or lesions   MUSCULOSKELETAL: Aquacel dressing intact, some mukund-incisional STS.  No appreciable ecchymosis.                   9.8    10.01 )-----------( 173      ( 27 Mar 2020 07:14 )             28.7     03-27    141  |  109<H>  |  15  ----------------------------<  228<H>  4.2   |  28  |  0.57    Ca    8.7      27 Mar 2020 07:14    TPro  6.5  /  Alb  3.5  /  TBili  0.6  /  DBili  x   /  AST  13<L>  /  ALT  18  /  AlkPhos  84  03-25    PT/INR - ( 26 Mar 2020 05:14 )   PT: 13.3 sec;   INR: 1.18 ratio         PTT - ( 26 Mar 2020 05:14 )  PTT:37.7 sec    03-26-20 @ 07:01  -  03-27-20 @ 07:00  --------------------------------------------------------  IN: 370 mL / OUT: 1000 mL / NET: -630 mL

## 2020-03-27 NOTE — OCCUPATIONAL THERAPY INITIAL EVALUATION ADULT - ADDITIONAL COMMENTS
Patient lives in a 2 level house with 5 steps with handrail to enter and 13 steps with handrail to access bedroom and bathroom. Patient reports there is no bathroom on main floor of house. Pt has a bathtub with curtain. Pt does not own any DME. Pt reports that her spouse can assist her with lower body dressing upon d/c home. Pt drives a car. Pt reports that she can "get a bed and stay on the main floor." Patient requires assistance with ADL's and transfers due to decreased ROM/strength Left LE, decreased endurance and impaired sitting/standing balance.

## 2020-03-28 LAB
ANION GAP SERPL CALC-SCNC: 5 MMOL/L — SIGNIFICANT CHANGE UP (ref 5–17)
BUN SERPL-MCNC: 24 MG/DL — HIGH (ref 7–23)
CALCIUM SERPL-MCNC: 9.2 MG/DL — SIGNIFICANT CHANGE UP (ref 8.5–10.1)
CHLORIDE SERPL-SCNC: 110 MMOL/L — HIGH (ref 96–108)
CO2 SERPL-SCNC: 28 MMOL/L — SIGNIFICANT CHANGE UP (ref 22–31)
CREAT SERPL-MCNC: 0.6 MG/DL — SIGNIFICANT CHANGE UP (ref 0.5–1.3)
GLUCOSE SERPL-MCNC: 250 MG/DL — HIGH (ref 70–99)
HCT VFR BLD CALC: 26.7 % — LOW (ref 34.5–45)
HCT VFR BLD CALC: 27 % — LOW (ref 34.5–45)
HGB BLD-MCNC: 9 G/DL — LOW (ref 11.5–15.5)
HGB BLD-MCNC: 9.1 G/DL — LOW (ref 11.5–15.5)
MCHC RBC-ENTMCNC: 30.5 PG — SIGNIFICANT CHANGE UP (ref 27–34)
MCHC RBC-ENTMCNC: 34.1 GM/DL — SIGNIFICANT CHANGE UP (ref 32–36)
MCV RBC AUTO: 89.6 FL — SIGNIFICANT CHANGE UP (ref 80–100)
NRBC # BLD: 0 /100 WBCS — SIGNIFICANT CHANGE UP (ref 0–0)
PLATELET # BLD AUTO: 178 K/UL — SIGNIFICANT CHANGE UP (ref 150–400)
POTASSIUM SERPL-MCNC: 3.9 MMOL/L — SIGNIFICANT CHANGE UP (ref 3.5–5.3)
POTASSIUM SERPL-SCNC: 3.9 MMOL/L — SIGNIFICANT CHANGE UP (ref 3.5–5.3)
RBC # BLD: 2.98 M/UL — LOW (ref 3.8–5.2)
RBC # FLD: 13.2 % — SIGNIFICANT CHANGE UP (ref 10.3–14.5)
SODIUM SERPL-SCNC: 143 MMOL/L — SIGNIFICANT CHANGE UP (ref 135–145)
WBC # BLD: 9.8 K/UL — SIGNIFICANT CHANGE UP (ref 3.8–10.5)
WBC # FLD AUTO: 9.8 K/UL — SIGNIFICANT CHANGE UP (ref 3.8–10.5)

## 2020-03-28 PROCEDURE — 99232 SBSQ HOSP IP/OBS MODERATE 35: CPT

## 2020-03-28 RX ORDER — INSULIN LISPRO 100/ML
3 VIAL (ML) SUBCUTANEOUS
Refills: 0 | Status: DISCONTINUED | OUTPATIENT
Start: 2020-03-28 | End: 2020-03-30

## 2020-03-28 RX ORDER — INSULIN GLARGINE 100 [IU]/ML
9 INJECTION, SOLUTION SUBCUTANEOUS AT BEDTIME
Refills: 0 | Status: DISCONTINUED | OUTPATIENT
Start: 2020-03-28 | End: 2020-03-30

## 2020-03-28 RX ORDER — CALCIUM CARBONATE 500(1250)
2 TABLET ORAL THREE TIMES A DAY
Refills: 0 | Status: DISCONTINUED | OUTPATIENT
Start: 2020-03-28 | End: 2020-03-31

## 2020-03-28 RX ADMIN — Medication 6: at 08:01

## 2020-03-28 RX ADMIN — Medication 975 MILLIGRAM(S): at 21:10

## 2020-03-28 RX ADMIN — Medication 975 MILLIGRAM(S): at 05:45

## 2020-03-28 RX ADMIN — Medication 20 MILLIGRAM(S): at 05:45

## 2020-03-28 RX ADMIN — Medication 2 TABLET(S): at 15:20

## 2020-03-28 RX ADMIN — Medication 2 UNIT(S): at 08:02

## 2020-03-28 RX ADMIN — Medication 100 MILLIGRAM(S): at 05:45

## 2020-03-28 RX ADMIN — SENNA PLUS 2 TABLET(S): 8.6 TABLET ORAL at 21:11

## 2020-03-28 RX ADMIN — Medication 975 MILLIGRAM(S): at 14:48

## 2020-03-28 RX ADMIN — Medication 3 UNIT(S): at 16:59

## 2020-03-28 RX ADMIN — Medication 2: at 21:51

## 2020-03-28 RX ADMIN — Medication 100 MILLIGRAM(S): at 11:21

## 2020-03-28 RX ADMIN — BENZOCAINE AND MENTHOL 1 LOZENGE: 5; 1 LIQUID ORAL at 11:21

## 2020-03-28 RX ADMIN — ENOXAPARIN SODIUM 40 MILLIGRAM(S): 100 INJECTION SUBCUTANEOUS at 05:45

## 2020-03-28 RX ADMIN — Medication 6: at 17:00

## 2020-03-28 RX ADMIN — Medication 975 MILLIGRAM(S): at 21:49

## 2020-03-28 RX ADMIN — Medication 3 UNIT(S): at 12:07

## 2020-03-28 RX ADMIN — INSULIN GLARGINE 9 UNIT(S): 100 INJECTION, SOLUTION SUBCUTANEOUS at 21:51

## 2020-03-28 RX ADMIN — Medication 975 MILLIGRAM(S): at 14:04

## 2020-03-28 RX ADMIN — Medication 6: at 12:07

## 2020-03-28 NOTE — PROGRESS NOTE ADULT - ASSESSMENT
79 year old female with past medical history of HTN DM presenting from home after a mechanical fall in her yard resulting in L hip fracture.   Cardiology consulted for pre op risk assessment prior to intramedullary benjamin placement.  EKG revealing NSR 81bpm  Chest xray with small opacity in right upper lung field, non-specific    s/p Left IMN  - followed by ortho  - pain control      HTN  - controlled as pr flow sheet, continue routine hemodynamic monitoring   - Cont Enalapril 20mg daily     DM  Management as per primary team     Acute blood loss anemia  - cont to monitor Hgb - on admission 13.5 - today 9.1  - transfuse as per primary team  - maintain hgb > 9    DVT PPX  - cont Lovenox as per primary team    -From a cardiac standpoint the patient may be discharged home    Monitor and replete electrolytes. Keep K>4.0 and Mg>2.0.  All other medical management as per primary team  Further Cardiac workup as per clinical course.  Will follow    [facility]  Cardiology   Spectra #[phone]

## 2020-03-28 NOTE — PROGRESS NOTE ADULT - PROBLEM SELECTOR PLAN 2
Detail Level: Zone Plan: Discussed 20 units for the glabella, 10 units for the forehead, 4 units for the tail of the eyebrows. 18 units for the periorbital if patient desires Continue enalapril  BP stable

## 2020-03-28 NOTE — PROGRESS NOTE ADULT - SUBJECTIVE AND OBJECTIVE BOX
Patient seen and examined at bedside, resting comfortably. Pain well controlled. Denies headache/dizziness/lightheadedness, chest pain, dyspnea, n/v, numbness/tingling. No complaints at this time. No overnight events.    Vital Signs Last 24 Hrs  T(C): 36.8 (28 Mar 2020 05:15), Max: 36.8 (27 Mar 2020 08:50)  T(F): 98.3 (28 Mar 2020 05:15), Max: 98.3 (27 Mar 2020 16:00)  HR: 90 (28 Mar 2020 05:15) (82 - 101)  BP: 148/57 (28 Mar 2020 05:15) (118/57 - 148/57)  BP(mean): --  RR: 18 (28 Mar 2020 05:15) (18 - 18)  SpO2: 95% (28 Mar 2020 05:15) (95% - 98%)    Physical Exam  Gen: NAD    LLE:   Dressing c/d/i  +EHL/FHL/Gsc/TA  SILT L3-S1  DP +  Compartments soft  No calf ttp

## 2020-03-28 NOTE — PROGRESS NOTE ADULT - PROBLEM SELECTOR PLAN 1
Pt taken to OR for intertrochanteric/gamma nail placement with Dr. Santoyo 3/26/2020  -Continue PT/OOB/Ambulation as tolerated  -Pain medication written  -Ancef completed  -Lovenox for DVT PPX  -Ortho recs  -Keep dressing in place  -Will monitor labs  -D/C planning: PT reccs [facility] based on eval yesterday. She wishes to go home instead. PT to re-eval and will then discuss dispo further.

## 2020-03-28 NOTE — PROGRESS NOTE ADULT - SUBJECTIVE AND OBJECTIVE BOX
[facility] -- Kirsten Church, Lisa, Rigo, Samuel Alvarez Savella  Office # [phone]      Follow Up:    Preop eval/Post follow up   Subjective/Observations:   No events overnight resting comfortably in bed.  No complaints of chest pain, dyspnea, or palpitations reported. No signs of orthopnea or PND.     REVIEW OF SYSTEMS: All other review of systems is negative unless indicated above    PAST MEDICAL & SURGICAL HISTORY:  Benign essential HTN  DM (diabetes mellitus)  S/P cartilage surgery      MEDICATIONS  (STANDING):  acetaminophen   Tablet .. 975 milliGRAM(s) Oral every 8 hours  dextrose 5%. 1000 milliLiter(s) (50 mL/Hr) IV Continuous <Continuous>  dextrose 50% Injectable 25 Gram(s) IV Push once  enalapril 20 milliGRAM(s) Oral daily  enoxaparin Injectable 40 milliGRAM(s) SubCutaneous every 24 hours  influenza   Vaccine 0.5 milliLiter(s) IntraMuscular once  insulin lispro (HumaLOG) corrective regimen sliding scale   SubCutaneous three times a day before meals  insulin lispro (HumaLOG) corrective regimen sliding scale   SubCutaneous at bedtime  lactated ringers. 1000 milliLiter(s) (75 mL/Hr) IV Continuous <Continuous>  senna 2 Tablet(s) Oral at bedtime    MEDICATIONS  (PRN):  artificial  tears Solution 1 Drop(s) Both EYES daily PRN Dry Eyes  benzocaine 15 mG/menthol 3.6 mG (Sugar-Free) Lozenge 1 Lozenge Oral every 4 hours PRN Sore Throat  guaiFENesin   Syrup  (Sugar-Free) 100 milliGRAM(s) Oral every 6 hours PRN Cough  melatonin 3 milliGRAM(s) Oral at bedtime PRN Insomnia  morphine  - Injectable 2 milliGRAM(s) IV Push every 3 hours PRN breakthrough pain  ondansetron Injectable 4 milliGRAM(s) IV Push every 6 hours PRN Nausea and/or Vomiting  oxyCODONE    IR 2.5 milliGRAM(s) Oral every 4 hours PRN Moderate Pain (4 - 6)  oxyCODONE    IR 5 milliGRAM(s) Oral every 4 hours PRN Severe Pain (7 - 10)  traMADol 25 milliGRAM(s) Oral every 4 hours PRN Mild Pain (1 - 3)      Allergies    No Known Allergies    Intolerances        Vital Signs Last 24 Hrs  T(C): 36.7 (28 Mar 2020 08:13), Max: 36.8 (27 Mar 2020 16:00)  T(F): 98 (28 Mar 2020 08:13), Max: 98.3 (27 Mar 2020 16:00)  HR: 84 (28 Mar 2020 08:13) (82 - 101)  BP: 133/66 (28 Mar 2020 08:13) (118/57 - 148/57)  BP(mean): --  RR: 18 (28 Mar 2020 08:13) (18 - 18)  SpO2: 95% (28 Mar 2020 08:13) (95% - 98%)    I&O's Summary    27 Mar 2020 07:01  -  28 Mar 2020 07:00  --------------------------------------------------------  IN: 560 mL / OUT: 800 mL / NET: -240 mL          PHYSICAL EXAM:  TELE: Off tele   Constitutional: NAD, awake and alert, well-developed  HEENT: Moist Mucous Membranes, Anicteric  Pulmonary: Non-labored, breath sounds are clear anteriorly and decreased on Rt base > left base, No wheezing, rales or rhonchi  Cardiovascular: Regular, S1 and S2 nl, No murmurs, rubs, gallops or clicks  Gastrointestinal: Bowel Sounds present, soft, nontender.   Lymph: No lymphadenopathy. No peripheral edema.  Skin: No visible rashes or ulcers.  Psych:  Mood & affect appropriate    LABS: All Labs Reviewed:                        9.1    9.80  )-----------( 178      ( 28 Mar 2020 06:57 )             26.7                         10.3   x     )-----------( x        ( 27 Mar 2020 14:45 )             30.8                         9.8    10.01 )-----------( 173      ( 27 Mar 2020 07:14 )             28.7     28 Mar 2020 06:57    143    |  110    |  24     ----------------------------<  250    3.9     |  28     |  0.60   27 Mar 2020 07:14    141    |  109    |  15     ----------------------------<  228    4.2     |  28     |  0.57   26 Mar 2020 05:14    139    |  106    |  20     ----------------------------<  213    4.3     |  27     |  0.59     Ca    9.2        28 Mar 2020 06:57  Ca    8.7        27 Mar 2020 07:14  Ca    8.8        26 Mar 2020 05:14    TPro  6.5    /  Alb  3.5    /  TBili  0.6    /  DBili  x      /  AST  13     /  ALT  18     /  AlkPhos  84     25 Mar 2020 14:20        < from: 12 Lead ECG (03.25.20 @ 14:04) >  Ventricular Rate 81 BPM    Atrial Rate 81 BPM    P-R Interval 128 ms    QRS Duration 74 ms    Q-T Interval 374 ms    QTC Calculation(Bezet) 434 ms    P Axis 72 degrees    R Axis 36 degrees    T Axis 41 degrees    Diagnosis Line Normal sinus rhythm  Normal ECG  No previous ECGs available  Confirmed by Sylvia Gonzalez MD ([id number]) on 3/25/2020 5:24:57 PM    < end of copied text >    < from: Xray Lumbar Spine AP + Lateral (03.25.20 @ 14:43) >  EXAM:  LUMBAR SPINE AP & LAT                            PROCEDURE DATE:  03/25/2020          INTERPRETATION:  Clinical information: Status post fall. Pain.    TECHNIQUE: AP, crosstable lateral, and coned-down crosstable lateral views of the lumbosacral spine.    COMPARISON: None available.    FINDINGS: No acute fractures or dislocations are noted. There is a moderate leftward convex curvature to the upper lumbar spine. There is no loss of vertebral body height. No aggressive osteolytic blastic or osteolytic lesions are notable. Scattered degenerative lucencies and areas of sclerosis are notable throughout the vertebral bodies and facets. Multilevel facet arthropathy is notable. Multilevel degenerative disc disease is notable. Bilateral SI joint arthrosis is noted.    IMPRESSION: No acute fractures or dislocations.    Multilevel lumbar spondylosis.                GIO GUALLPA M.D., ATTENDING RADIOLOGIST  This document has been electronically signed. Mar 25 2020  2:52PM                < end of copied text >  < from: Xray Femur 2 Views, Left (03.25.20 @ 14:42) >  EXAM:  XR FEMUR 2 VIEWS LT                          EXAM:  XR HIP WITH PELV 2-3V LT                            PROCEDURE DATE:  03/25/2020          INTERPRETATION:  X-rays of the pelvis, left hip and left femur    Indication: The patient fell.    AP view of the pelvis, 2 views of the left hip and 3 views of the left femur are acquired without a previous examination for comparison.    Impression: Acute comminuted left intertrochanteric fracture. This finding is communicated with the emergency department via the PACS communication system.    No dislocation.    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                KAYKAY TINOCO M.D., ATTENDING RADIOLOGIST  This document has been electronically signed. Mar 25 2020  2:51PM                < end of copied text >  < from: Xray Chest 1 View AP/PA (03.25.20 @ 14:42) >    EXAM:  XR CHEST AP OR PA 1V                            PROCEDURE DATE:  03/25/2020          INTERPRETATION:  INDICATION:Patient fell. Rule out injury.    COMPARISON: None available    FINDINGS:    Heart: The heart size is normal.    Mediastinum: Mediastinal contours are normal. There are no enlarged mediastinal or hilar nodes.    Lungs: The lungs are clear. Small and increased opacity seen in the right upper lung field overlying the right posterior sixth rib    Osseous structures: The osseous structures are intact. No sign of acute fracture. Shallow dextroscoliosis of the lower thoracic spine.    Soft tissues:There are no soft tissue abnormalities    Pleura: There are no pleural effusions.    IMPRESSION:    Small opacity in the right upper lung field, nonspecific.    No sign of rib fracture                SYLVIA DELA CRUZ M.D.; ATTENDING RADIOLOGIST  This document has been electronically signed. Mar 25 2020  2:54PM                < end of copied text >

## 2020-03-28 NOTE — PROGRESS NOTE ADULT - ASSESSMENT
A/P: 79y Female sp L IMN POD 2    Pain control  DVT ppx- SCDs, Lovenox   PT/OT, WBAT  Medical management per primary team  Dispo planning- home when medically stable. F/U w/ Dr Santoyo in 2 weeks. Call office for appt. Aspirin 325 BID for 30 days for DVT ppx on discharge.  D/w attending

## 2020-03-28 NOTE — PROGRESS NOTE ADULT - NSHPATTENDINGPLANDISCUSS_GEN_ALL_CORE
patient and daughter via telephone, re: PT eval, ambulation, monitoring blood levels, anticipated hospital course, discharge planning

## 2020-03-28 NOTE — PROGRESS NOTE ADULT - PROBLEM SELECTOR PLAN 3
On daily insulin at home- Novolog  Continue sliding scale  Increase Humalog to 3 units TID and Lantus 9 units at bedtime.  Hypoglycemia protocol ordered  Accuchecks pre meals and before bed  HGB A1C- 7.2(3/25/2020)  Diabetes education  Monitor blood glucose  Consistent carb with Dash/TLC diet.

## 2020-03-28 NOTE — PROGRESS NOTE ADULT - SUBJECTIVE AND OBJECTIVE BOX
Surgery PA note, on behalf of Hospitalist, Dr. Norris:      From intake H&P:  Pt is a 80 yo F presenting from home after a mechanical fall in her yard resulting in L hip fracture. PMH DM2 and pinched nerve, HTN.   Patient seen and examined at bedside. Patient states that today she was in her yard today with her dog when she fell on her left side due to some sand bags being on the floor. She denies any head trauma, syncope or pre-syncopal symptoms. She tripped due to the sand bags being on the floor. She has L hip pain 10/10 in severity worse with movement, improved by laying still, pain is sharp. No other complaints. Denies headaches, nausea, vomiting, chest pain, SOB, palpitations, abdominal pain, constipation, diarrhea, melena, hematochezia, dysuria. She has never had any adverse reactions to anesthesia in the past. She denies past hx of TIA/MI/CVA.   In ED patient noted to have L hip fracture. (25 Mar 2020 16:00)    Subjective - Today, 3/28/20:   Patient seen and examined at bedside.  No overnight events. Patient sitting in be eating breakfast and appears comfortable. Says she did OK with PT.  Tolerating diet, denies N/V.  Denies CP/SOB/KEBEDE/palpitations, dizziness/lightheadedness.        REVIEW OF SYSTEMS:  CONSTITUTIONAL: Denies f/c, lying comfortably  EYES/ENT: mild throat dryness/discomfort  RESPIRATORY: No cough, wheezing, hemoptysis; No shortness of breath  CARDIOVASCULAR: No chest pain or palpitations  GASTROINTESTINAL: No abdominal, nausea, vomiting, or hematemesis; No diarrhea or constipation. No melena or hematochezia.  NEUROLOGICAL: No dizziness, numbness, or weakness  SKIN: No itching, burning, rashes, or lesions   All other review of systems is negative unless indicated above.      MEDICATIONS  (STANDING):  acetaminophen   Tablet .. 975 milliGRAM(s) Oral every 8 hours  dextrose 5%. 1000 milliLiter(s) (50 mL/Hr) IV Continuous <Continuous>  dextrose 50% Injectable 25 Gram(s) IV Push once  enalapril 20 milliGRAM(s) Oral daily  enoxaparin Injectable 40 milliGRAM(s) SubCutaneous every 24 hours  influenza   Vaccine 0.5 milliLiter(s) IntraMuscular once  insulin lispro (HumaLOG) corrective regimen sliding scale   SubCutaneous three times a day before meals  insulin lispro (HumaLOG) corrective regimen sliding scale   SubCutaneous at bedtime  insulin lispro Injectable (HumaLOG) 2 Unit(s) SubCutaneous three times a day before meals  lactated ringers. 1000 milliLiter(s) (75 mL/Hr) IV Continuous <Continuous>  senna 2 Tablet(s) Oral at bedtime    MEDICATIONS  (PRN):  artificial  tears Solution 1 Drop(s) Both EYES daily PRN Dry Eyes  benzocaine 15 mG/menthol 3.6 mG (Sugar-Free) Lozenge 1 Lozenge Oral every 4 hours PRN Sore Throat  guaiFENesin   Syrup  (Sugar-Free) 100 milliGRAM(s) Oral every 6 hours PRN Cough  melatonin 3 milliGRAM(s) Oral at bedtime PRN Insomnia  morphine  - Injectable 2 milliGRAM(s) IV Push every 3 hours PRN breakthrough pain  ondansetron Injectable 4 milliGRAM(s) IV Push every 6 hours PRN Nausea and/or Vomiting  oxyCODONE    IR 2.5 milliGRAM(s) Oral every 4 hours PRN Moderate Pain (4 - 6)  oxyCODONE    IR 5 milliGRAM(s) Oral every 4 hours PRN Severe Pain (7 - 10)  traMADol 25 milliGRAM(s) Oral every 4 hours PRN Mild Pain (1 - 3)      Vital Signs Last 24 Hrs  T(C): 36.7 (28 Mar 2020 08:13), Max: 36.8 (27 Mar 2020 16:00)  T(F): 98 (28 Mar 2020 08:13), Max: 98.3 (27 Mar 2020 16:00)  HR: 84 (28 Mar 2020 08:13) (82 - 101)  BP: 133/66 (28 Mar 2020 08:13) (118/57 - 148/57)  BP(mean): --  RR: 18 (28 Mar 2020 08:13) (18 - 18)  SpO2: 95% (28 Mar 2020 08:13) (95% - 98%)      PHYSICAL EXAM  GENERAL: no acute distress  HEENT: NC/AT, EOMI, neck supple  RESPIRATORY: CTA bilat w/o W/R/R  CARDIOVASCULAR: RRR, no murmurs, gallops, rubs  ABDOMINAL: soft, non-tender, non-distended, positive bowel sounds   EXTREMITIES: SCD in place.  Distal pulses intact.  Good sensation b/l.  Able to move her toes. Calves soft/NT bilat LE's.   NEUROLOGICAL: alert and oriented x 3, non-focal  SKIN: no rashes or lesions   MUSCULOSKELETAL: Aquacel dressing intact, some mukund-incisional STS.  No appreciable ecchymosis.                            9.1    9.80  )-----------( 178      ( 28 Mar 2020 06:57 )             26.7   03-28    143  |  110<H>  |  24<H>  ----------------------------<  250<H>  3.9   |  28  |  0.60    Ca    9.2      28 Mar 2020 06:57      CAPILLARY BLOOD GLUCOSE  POCT Blood Glucose.: 259 mg/dL (28 Mar 2020 07:55)  POCT Blood Glucose.: 273 mg/dL (27 Mar 2020 22:22)  POCT Blood Glucose.: 338 mg/dL (27 Mar 2020 17:24)  POCT Blood Glucose.: 324 mg/dL (27 Mar 2020 11:55)

## 2020-03-29 LAB
ANION GAP SERPL CALC-SCNC: 3 MMOL/L — LOW (ref 5–17)
BUN SERPL-MCNC: 27 MG/DL — HIGH (ref 7–23)
CALCIUM SERPL-MCNC: 9.4 MG/DL — SIGNIFICANT CHANGE UP (ref 8.5–10.1)
CHLORIDE SERPL-SCNC: 111 MMOL/L — HIGH (ref 96–108)
CO2 SERPL-SCNC: 30 MMOL/L — SIGNIFICANT CHANGE UP (ref 22–31)
CREAT SERPL-MCNC: 0.7 MG/DL — SIGNIFICANT CHANGE UP (ref 0.5–1.3)
GLUCOSE SERPL-MCNC: 297 MG/DL — HIGH (ref 70–99)
HCT VFR BLD CALC: 27.6 % — LOW (ref 34.5–45)
HGB BLD-MCNC: 9.1 G/DL — LOW (ref 11.5–15.5)
MCHC RBC-ENTMCNC: 30.2 PG — SIGNIFICANT CHANGE UP (ref 27–34)
MCHC RBC-ENTMCNC: 33 GM/DL — SIGNIFICANT CHANGE UP (ref 32–36)
MCV RBC AUTO: 91.7 FL — SIGNIFICANT CHANGE UP (ref 80–100)
NRBC # BLD: 0 /100 WBCS — SIGNIFICANT CHANGE UP (ref 0–0)
PLATELET # BLD AUTO: 218 K/UL — SIGNIFICANT CHANGE UP (ref 150–400)
POTASSIUM SERPL-MCNC: 3.5 MMOL/L — SIGNIFICANT CHANGE UP (ref 3.5–5.3)
POTASSIUM SERPL-SCNC: 3.5 MMOL/L — SIGNIFICANT CHANGE UP (ref 3.5–5.3)
RBC # BLD: 3.01 M/UL — LOW (ref 3.8–5.2)
RBC # FLD: 13.2 % — SIGNIFICANT CHANGE UP (ref 10.3–14.5)
SODIUM SERPL-SCNC: 144 MMOL/L — SIGNIFICANT CHANGE UP (ref 135–145)
WBC # BLD: 9.18 K/UL — SIGNIFICANT CHANGE UP (ref 3.8–10.5)
WBC # FLD AUTO: 9.18 K/UL — SIGNIFICANT CHANGE UP (ref 3.8–10.5)

## 2020-03-29 PROCEDURE — 99232 SBSQ HOSP IP/OBS MODERATE 35: CPT

## 2020-03-29 RX ADMIN — Medication 975 MILLIGRAM(S): at 15:33

## 2020-03-29 RX ADMIN — Medication 20 MILLIGRAM(S): at 06:00

## 2020-03-29 RX ADMIN — Medication 6: at 11:47

## 2020-03-29 RX ADMIN — TRAMADOL HYDROCHLORIDE 25 MILLIGRAM(S): 50 TABLET ORAL at 12:30

## 2020-03-29 RX ADMIN — Medication 100 MILLIGRAM(S): at 06:57

## 2020-03-29 RX ADMIN — Medication 2 TABLET(S): at 06:58

## 2020-03-29 RX ADMIN — Medication 975 MILLIGRAM(S): at 07:35

## 2020-03-29 RX ADMIN — ENOXAPARIN SODIUM 40 MILLIGRAM(S): 100 INJECTION SUBCUTANEOUS at 06:01

## 2020-03-29 RX ADMIN — Medication 3 UNIT(S): at 11:46

## 2020-03-29 RX ADMIN — TRAMADOL HYDROCHLORIDE 25 MILLIGRAM(S): 50 TABLET ORAL at 13:30

## 2020-03-29 RX ADMIN — Medication 975 MILLIGRAM(S): at 22:52

## 2020-03-29 RX ADMIN — Medication 3 UNIT(S): at 08:14

## 2020-03-29 RX ADMIN — Medication 3 UNIT(S): at 17:05

## 2020-03-29 RX ADMIN — Medication 975 MILLIGRAM(S): at 06:00

## 2020-03-29 RX ADMIN — Medication 6: at 08:14

## 2020-03-29 RX ADMIN — BENZOCAINE AND MENTHOL 1 LOZENGE: 5; 1 LIQUID ORAL at 22:50

## 2020-03-29 RX ADMIN — Medication 1: at 22:50

## 2020-03-29 RX ADMIN — Medication 4: at 17:05

## 2020-03-29 RX ADMIN — INSULIN GLARGINE 9 UNIT(S): 100 INJECTION, SOLUTION SUBCUTANEOUS at 22:51

## 2020-03-29 RX ADMIN — Medication 975 MILLIGRAM(S): at 16:30

## 2020-03-29 NOTE — PROGRESS NOTE ADULT - PROBLEM SELECTOR PLAN 3
On daily insulin at home- Novolog  Continue sliding scale  Increase Humalog to 3 units TID and Lantus 9 units at bedtime.  Hypoglycemia protocol ordered  Accuchecks pre meals and before bed  HGB A1C- 7.2(3/25/2020)  Diabetes education  Monitor blood glucose this AM, and adjust if nighttime Lantus has not improved glucose readings.   Consistent carb with Dash/TLC diet.

## 2020-03-29 NOTE — PROGRESS NOTE ADULT - SUBJECTIVE AND OBJECTIVE BOX
Patient seen and examined at bedside, resting comfortably. Pain well controlled. Denies headache/dizziness/lightheadedness, chest pain, dyspnea, n/v, numbness/tingling. No complaints at this time. No overnight events.    Vital Signs Last 24 Hrs  T(C): 36.5 (29 Mar 2020 04:20), Max: 37.2 (29 Mar 2020 00:29)  T(F): 97.7 (29 Mar 2020 04:20), Max: 98.9 (29 Mar 2020 00:29)  HR: 79 (29 Mar 2020 04:20) (79 - 95)  BP: 148/69 (29 Mar 2020 04:20) (111/55 - 155/68)  BP(mean): --  RR: 16 (29 Mar 2020 04:20) (16 - 18)  SpO2: 96% (29 Mar 2020 04:20) (95% - 98%)    Physical Exam  Gen: NAD    LLE:   Dressing c/d/i (minimal strike through)  +EHL/FHL/Gsc/TA  SILT L3-S1  DP +  Compartments soft  No calf ttp

## 2020-03-29 NOTE — PROGRESS NOTE ADULT - ASSESSMENT
A/P: 79y Female sp L IMN POD 3    Pain control  DVT ppx- SCDs, Lovenox   PT/OT, WBAT  ortho stable   no additional acute orthopedic intervention   Medical management per primary team  Dispo planning- home/rehab when medically stable. F/U w/ Dr Santoyo in 2 weeks. Call office for appt. Aspirin 325 BID for 30 days for DVT ppx on discharge.  D/w attending

## 2020-03-29 NOTE — PROGRESS NOTE ADULT - PROBLEM SELECTOR PLAN 1
Pt taken to OR for intertrochanteric/gamma nail placement with Dr. Santoyo 3/26/2020  -Continue PT/OOB/Ambulation as tolerated  -Pain medication written  -Ancef completed  -Lovenox for DVT PPX  -Ortho recs  -Keep dressing in place  -Will monitor labs  -D/C planning: PT reccs [facility] based on eval yesterday. She wishes to go home instead. PT to re-eval and will then discuss dispo further. Pt taken to OR for intertrochanteric/gamma nail placement with Dr. Santoyo 3/26/2020  -Continue PT/OOB/Ambulation as tolerated  -Pain medication written  -Ancef completed  -Lovenox for DVT PPX  -Ortho recs  -Keep dressing in place  -Will monitor labs  -D/C planning: PT reccs [facility] TERRAZAS to work with patient and daughter to make arrangements.

## 2020-03-29 NOTE — PROGRESS NOTE ADULT - SUBJECTIVE AND OBJECTIVE BOX
From intake H&P:  Pt is a 78 yo F presenting from home after a mechanical fall in her yard resulting in L hip fracture. PMH DM2 and pinched nerve, HTN.   Patient seen and examined at bedside. Patient states that today she was in her yard today with her dog when she fell on her left side due to some sand bags being on the floor. She denies any head trauma, syncope or pre-syncopal symptoms. She tripped due to the sand bags being on the floor. She has L hip pain 10/10 in severity worse with movement, improved by laying still, pain is sharp. No other complaints. Denies headaches, nausea, vomiting, chest pain, SOB, palpitations, abdominal pain, constipation, diarrhea, melena, hematochezia, dysuria. She has never had any adverse reactions to anesthesia in the past. She denies past hx of TIA/MI/CVA.   In ED patient noted to have L hip fracture. (25 Mar 2020 16:00)    Subjective - Today, 3/29/20:   Patient seen and examined at bedside.  No overnight events. Pain is controlled. Tolerating diet, denies N/V.  Denies CP/SOB/KEBEDE/palpitations, dizziness/lightheadedness. She is anxious to leave hospital.      REVIEW OF SYSTEMS:  CONSTITUTIONAL: Denies f/c  EYES/ENT: mild throat dryness/discomfort  RESPIRATORY: No cough, wheezing, hemoptysis; No shortness of breath  CARDIOVASCULAR: No chest pain or palpitations  GASTROINTESTINAL: No abdominal, nausea, vomiting, or hematemesis; No diarrhea or constipation. No melena or hematochezia.  NEUROLOGICAL: No dizziness, numbness, or weakness  SKIN: No itching, burning, rashes, or lesions   All other review of systems is negative unless indicated above.      MEDICATIONS  (STANDING):  acetaminophen   Tablet .. 975 milliGRAM(s) Oral every 8 hours  dextrose 5%. 1000 milliLiter(s) (50 mL/Hr) IV Continuous <Continuous>  dextrose 50% Injectable 25 Gram(s) IV Push once  enalapril 20 milliGRAM(s) Oral daily  enoxaparin Injectable 40 milliGRAM(s) SubCutaneous every 24 hours  influenza   Vaccine 0.5 milliLiter(s) IntraMuscular once  insulin glargine Injectable (LANTUS) 9 Unit(s) SubCutaneous at bedtime  insulin lispro (HumaLOG) corrective regimen sliding scale   SubCutaneous three times a day before meals  insulin lispro (HumaLOG) corrective regimen sliding scale   SubCutaneous at bedtime  insulin lispro Injectable (HumaLOG) 3 Unit(s) SubCutaneous three times a day with meals  lactated ringers. 1000 milliLiter(s) (75 mL/Hr) IV Continuous <Continuous>  senna 2 Tablet(s) Oral at bedtime    MEDICATIONS  (PRN):  artificial  tears Solution 1 Drop(s) Both EYES daily PRN Dry Eyes  benzocaine 15 mG/menthol 3.6 mG (Sugar-Free) Lozenge 1 Lozenge Oral every 4 hours PRN Sore Throat  calcium carbonate    500 mG (Tums) Chewable 2 Tablet(s) Chew three times a day PRN Heartburn  guaiFENesin   Syrup  (Sugar-Free) 100 milliGRAM(s) Oral every 6 hours PRN Cough  melatonin 3 milliGRAM(s) Oral at bedtime PRN Insomnia  morphine  - Injectable 2 milliGRAM(s) IV Push every 3 hours PRN breakthrough pain  ondansetron Injectable 4 milliGRAM(s) IV Push every 6 hours PRN Nausea and/or Vomiting  oxyCODONE    IR 2.5 milliGRAM(s) Oral every 4 hours PRN Moderate Pain (4 - 6)  oxyCODONE    IR 5 milliGRAM(s) Oral every 4 hours PRN Severe Pain (7 - 10)  traMADol 25 milliGRAM(s) Oral every 4 hours PRN Mild Pain (1 - 3)      Vital Signs Last 24 Hrs  T(C): 37.2 (29 Mar 2020 00:29), Max: 37.2 (29 Mar 2020 00:29)  T(F): 98.9 (29 Mar 2020 00:29), Max: 98.9 (29 Mar 2020 00:29)  HR: 84 (29 Mar 2020 00:29) (84 - 95)  BP: 131/65 (29 Mar 2020 00:29) (111/55 - 155/68)  BP(mean): --  RR: 16 (29 Mar 2020 00:29) (16 - 18)  SpO2: 98% (29 Mar 2020 00:29) (95% - 98%)      PHYSICAL EXAM  GENERAL: no acute distress  HEENT: NC/AT, EOMI, neck supple  RESPIRATORY: CTA bilat w/o W/R/R  CARDIOVASCULAR: RRR, no murmurs, gallops, rubs  ABDOMINAL: soft, non-tender, non-distended, positive bowel sounds   EXTREMITIES: SCD in place.  Distal pulses intact.  Good sensation b/l.  Able to move her toes. Calves soft/NT bilat LE's.   NEUROLOGICAL: alert and oriented x 3, non-focal  SKIN: no rashes or lesions   MUSCULOSKELETAL: Aquacel dressing intact on hip and near knee.  No appreciable ecchymosis.                  Labs: PENDING      CAPILLARY BLOOD GLUCOSE  POCT Blood Glucose.: 313 mg/dL (28 Mar 2020 21:48)  POCT Blood Glucose.: 285 mg/dL (28 Mar 2020 16:57)  POCT Blood Glucose.: 257 mg/dL (28 Mar 2020 12:00)  POCT Blood Glucose.: 259 mg/dL (28 Mar 2020 07:55)

## 2020-03-29 NOTE — PROGRESS NOTE ADULT - NSHPATTENDINGPLANDISCUSS_GEN_ALL_CORE
patient re: ambulation, anemia, monitoring blood levels, anticipated hospital course, discharge planning

## 2020-03-29 NOTE — PROGRESS NOTE ADULT - SUBJECTIVE AND OBJECTIVE BOX
[facility] -- Kirsten Church, Rigo Gonzalez, Samuel Alvarez Savella  Office # [phone]      Follow Up:  Preop eval/Post follow up    Subjective/Observations: Seen and examined.  Awake and alert resting comfortably in bed sitting up.  Reports having sinus congestion but denies cp, sob or palpitations.  No signs of orthopnea or PND.  NAD.        REVIEW OF SYSTEMS: All other review of systems is negative unless indicated above    PAST MEDICAL & SURGICAL HISTORY:  Benign essential HTN  DM (diabetes mellitus)  S/P cartilage surgery      MEDICATIONS  (STANDING):  acetaminophen   Tablet .. 975 milliGRAM(s) Oral every 8 hours  dextrose 5%. 1000 milliLiter(s) (50 mL/Hr) IV Continuous <Continuous>  dextrose 50% Injectable 25 Gram(s) IV Push once  enalapril 20 milliGRAM(s) Oral daily  enoxaparin Injectable 40 milliGRAM(s) SubCutaneous every 24 hours  influenza   Vaccine 0.5 milliLiter(s) IntraMuscular once  insulin glargine Injectable (LANTUS) 9 Unit(s) SubCutaneous at bedtime  insulin lispro (HumaLOG) corrective regimen sliding scale   SubCutaneous three times a day before meals  insulin lispro (HumaLOG) corrective regimen sliding scale   SubCutaneous at bedtime  insulin lispro Injectable (HumaLOG) 3 Unit(s) SubCutaneous three times a day with meals  lactated ringers. 1000 milliLiter(s) (75 mL/Hr) IV Continuous <Continuous>  senna 2 Tablet(s) Oral at bedtime    MEDICATIONS  (PRN):  artificial  tears Solution 1 Drop(s) Both EYES daily PRN Dry Eyes  benzocaine 15 mG/menthol 3.6 mG (Sugar-Free) Lozenge 1 Lozenge Oral every 4 hours PRN Sore Throat  calcium carbonate    500 mG (Tums) Chewable 2 Tablet(s) Chew three times a day PRN Heartburn  guaiFENesin   Syrup  (Sugar-Free) 100 milliGRAM(s) Oral every 6 hours PRN Cough  melatonin 3 milliGRAM(s) Oral at bedtime PRN Insomnia  morphine  - Injectable 2 milliGRAM(s) IV Push every 3 hours PRN breakthrough pain  ondansetron Injectable 4 milliGRAM(s) IV Push every 6 hours PRN Nausea and/or Vomiting  oxyCODONE    IR 2.5 milliGRAM(s) Oral every 4 hours PRN Moderate Pain (4 - 6)  oxyCODONE    IR 5 milliGRAM(s) Oral every 4 hours PRN Severe Pain (7 - 10)  traMADol 25 milliGRAM(s) Oral every 4 hours PRN Mild Pain (1 - 3)      Allergies    No Known Allergies    Intolerances            Vital Signs Last 24 Hrs  T(C): 36.7 (29 Mar 2020 09:10), Max: 37.2 (29 Mar 2020 00:29)  T(F): 98 (29 Mar 2020 09:10), Max: 98.9 (29 Mar 2020 00:29)  HR: 83 (29 Mar 2020 09:10) (79 - 90)  BP: 154/60 (29 Mar 2020 09:10) (111/55 - 155/68)  BP(mean): --  RR: 18 (29 Mar 2020 09:10) (16 - 18)  SpO2: 98% (29 Mar 2020 09:10) (96% - 98%)    I&O's Summary    28 Mar 2020 07:01  -  29 Mar 2020 07:00  --------------------------------------------------------  IN: 320 mL / OUT: 500 mL / NET: -180 mL          PHYSICAL EXAM:  TELE: Not on tele  Constitutional: NAD, awake and alert, well-developed  HEENT: Moist Mucous Membranes, Anicteric  Pulmonary: Non-labored, breath sounds are clear bilaterally, No wheezing, rales or rhonchi  Cardiovascular: Regular, S1 and S2, No murmurs, rubs, gallops or clicks  Gastrointestinal: Bowel Sounds present, soft, nontender.   Lymph: Firm with swelling left lateral thigh at incision site with no lower extremity edema. No lymphadenopathy.  Skin: No visible rashes or ulcers.  Left hip dressing with scant drainage and firm with swelling left lateral thigh at incision site with no lower extremity edema.   Psych:  Mood & affect appropriate    LABS: All Labs Reviewed:                        9.1    9.18  )-----------( 218      ( 29 Mar 2020 10:23 )             27.6                         9.0    x     )-----------( x        ( 28 Mar 2020 14:27 )             27.0                         9.1    9.80  )-----------( 178      ( 28 Mar 2020 06:57 )             26.7     29 Mar 2020 10:23    144    |  111    |  27     ----------------------------<  297    3.5     |  30     |  0.70   28 Mar 2020 06:57    143    |  110    |  24     ----------------------------<  250    3.9     |  28     |  0.60   27 Mar 2020 07:14    141    |  109    |  15     ----------------------------<  228    4.2     |  28     |  0.57     Ca    9.4        29 Mar 2020 10:23  Ca    9.2        28 Mar 2020 06:57  Ca    8.7        27 Mar 2020 07:14    < from: 12 Lead ECG (03.25.20 @ 14:04) >  Ventricular Rate 81 BPM    Atrial Rate 81 BPM    P-R Interval 128 ms    QRS Duration 74 ms    Q-T Interval 374 ms    QTC Calculation(Bezet) 434 ms    P Axis 72 degrees    R Axis 36 degrees    T Axis 41 degrees    Diagnosis Line Normal sinus rhythm  Normal ECG  No previous ECGs available  Confirmed by Lefty Gonzalez MD ([id number]) on 3/25/2020 5:24:57 PM    < end of copied text >  < from: Xray Lumbar Spine AP + Lateral (03.25.20 @ 14:43) >    EXAM:  LUMBAR SPINE AP & LAT                            PROCEDURE DATE:  03/25/2020          INTERPRETATION:  Clinical information: Status post fall. Pain.    TECHNIQUE: AP, crosstable lateral, and coned-down crosstable lateral views of the lumbosacral spine.    COMPARISON: None available.    FINDINGS: No acute fractures or dislocations are noted. There is a moderate leftward convex curvature to the upper lumbar spine. There is no loss of vertebral body height. No aggressive osteolytic blastic or osteolytic lesions are notable. Scattered degenerative lucencies and areas of sclerosis are notable throughout the vertebral bodies and facets. Multilevel facet arthropathy is notable. Multilevel degenerative disc disease is notable. Bilateral SI joint arthrosis is noted.    IMPRESSION: No acute fractures or dislocations.    Multilevel lumbar spondylosis.                GIO GUALLPA M.D., ATTENDING RADIOLOGIST  This document has been electronically signed. Mar 25 2020  2:52PM                < end of copied text >      < from: Xray Femur 2 Views, Left (03.25.20 @ 14:42) >  EXAM:  XR FEMUR 2 VIEWS LT                          EXAM:  XR HIP WITH PELV 2-3V LT                            PROCEDURE DATE:  03/25/2020          INTERPRETATION:  X-rays of the pelvis, left hip and left femur    Indication: The patient fell.    AP view of the pelvis, 2 views of the left hip and 3 views of the left femur are acquired without a previous examination for comparison.    Impression: Acute comminuted left intertrochanteric fracture. This finding is communicated with the emergency department via the PACS communication system.    No dislocation.    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                KAYKAY TINOCO M.D., ATTENDING RADIOLOGIST  This document has been electronically signed. Mar 25 2020  2:51PM                < end of copied text >

## 2020-03-29 NOTE — PROGRESS NOTE ADULT - ASSESSMENT
79 year old female with past medical history of HTN DM presenting from home after a mechanical fall in her yard resulting in L hip fracture.   Cardiology consulted for pre op risk assessment prior to intramedullary benjamin placement.  EKG revealing NSR 81bpm  Chest xray with small opacity in right upper lung field, non-specific    s/p Left IMN  - followed by ortho  - pain control    HTN  - controlled as per flow sheet, continue routine hemodynamic monitoring   - Cont Enalapril 20mg daily     DM  Management as per primary team     Acute blood loss anemia  - cont to monitor Hgb - on admission 13.5 - today 9.1 stable for 3 days  - transfuse as per primary team  - maintain hgb > 9    DVT PPX  - cont Lovenox as per primary team    -From a cardiac standpoint the patient may be discharged home    Monitor and replete electrolytes. Keep K>4.0 and Mg>2.0.  All other medical management as per primary team  Further Cardiac workup as per clinical course.  Will follow    D/C planning    JENNYFER Rdz-BC  Cardiology   Spectra #[phone]

## 2020-03-30 LAB
ANION GAP SERPL CALC-SCNC: 4 MMOL/L — LOW (ref 5–17)
BUN SERPL-MCNC: 24 MG/DL — HIGH (ref 7–23)
CALCIUM SERPL-MCNC: 9.5 MG/DL — SIGNIFICANT CHANGE UP (ref 8.5–10.1)
CHLORIDE SERPL-SCNC: 114 MMOL/L — HIGH (ref 96–108)
CO2 SERPL-SCNC: 28 MMOL/L — SIGNIFICANT CHANGE UP (ref 22–31)
CREAT SERPL-MCNC: 0.49 MG/DL — LOW (ref 0.5–1.3)
GLUCOSE SERPL-MCNC: 234 MG/DL — HIGH (ref 70–99)
HCT VFR BLD CALC: 27.1 % — LOW (ref 34.5–45)
HGB BLD-MCNC: 9 G/DL — LOW (ref 11.5–15.5)
MCHC RBC-ENTMCNC: 30.1 PG — SIGNIFICANT CHANGE UP (ref 27–34)
MCHC RBC-ENTMCNC: 33.2 GM/DL — SIGNIFICANT CHANGE UP (ref 32–36)
MCV RBC AUTO: 90.6 FL — SIGNIFICANT CHANGE UP (ref 80–100)
NRBC # BLD: 0 /100 WBCS — SIGNIFICANT CHANGE UP (ref 0–0)
PLATELET # BLD AUTO: 221 K/UL — SIGNIFICANT CHANGE UP (ref 150–400)
POTASSIUM SERPL-MCNC: 4 MMOL/L — SIGNIFICANT CHANGE UP (ref 3.5–5.3)
POTASSIUM SERPL-SCNC: 4 MMOL/L — SIGNIFICANT CHANGE UP (ref 3.5–5.3)
RBC # BLD: 2.99 M/UL — LOW (ref 3.8–5.2)
RBC # FLD: 13.2 % — SIGNIFICANT CHANGE UP (ref 10.3–14.5)
SODIUM SERPL-SCNC: 146 MMOL/L — HIGH (ref 135–145)
WBC # BLD: 6.81 K/UL — SIGNIFICANT CHANGE UP (ref 3.8–10.5)
WBC # FLD AUTO: 6.81 K/UL — SIGNIFICANT CHANGE UP (ref 3.8–10.5)

## 2020-03-30 PROCEDURE — 99239 HOSP IP/OBS DSCHRG MGMT >30: CPT

## 2020-03-30 PROCEDURE — 99232 SBSQ HOSP IP/OBS MODERATE 35: CPT

## 2020-03-30 RX ORDER — CALCIUM CARBONATE 500(1250)
2 TABLET ORAL
Qty: 0 | Refills: 0 | DISCHARGE
Start: 2020-03-30

## 2020-03-30 RX ORDER — INSULIN ASPART 100 [IU]/ML
0 INJECTION, SUSPENSION SUBCUTANEOUS
Qty: 0 | Refills: 0 | DISCHARGE

## 2020-03-30 RX ORDER — INSULIN GLARGINE 100 [IU]/ML
12 INJECTION, SOLUTION SUBCUTANEOUS
Qty: 0 | Refills: 0 | DISCHARGE
Start: 2020-03-30

## 2020-03-30 RX ORDER — TRAMADOL HYDROCHLORIDE 50 MG/1
1 TABLET ORAL
Qty: 0 | Refills: 0 | DISCHARGE
Start: 2020-03-30

## 2020-03-30 RX ORDER — LANOLIN ALCOHOL/MO/W.PET/CERES
1 CREAM (GRAM) TOPICAL
Qty: 0 | Refills: 0 | DISCHARGE
Start: 2020-03-30

## 2020-03-30 RX ORDER — BENZOCAINE AND MENTHOL 5; 1 G/100ML; G/100ML
1 LIQUID ORAL
Qty: 0 | Refills: 0 | DISCHARGE
Start: 2020-03-30

## 2020-03-30 RX ORDER — INSULIN LISPRO 100/ML
4 VIAL (ML) SUBCUTANEOUS
Refills: 0 | Status: DISCONTINUED | OUTPATIENT
Start: 2020-03-30 | End: 2020-03-31

## 2020-03-30 RX ORDER — TRAMADOL HYDROCHLORIDE 50 MG/1
1 TABLET ORAL
Qty: 0 | Refills: 0 | DISCHARGE

## 2020-03-30 RX ORDER — TRAMADOL HYDROCHLORIDE 50 MG/1
25 TABLET ORAL EVERY 6 HOURS
Refills: 0 | Status: DISCONTINUED | OUTPATIENT
Start: 2020-03-30 | End: 2020-03-31

## 2020-03-30 RX ORDER — INSULIN LISPRO 100/ML
0 VIAL (ML) SUBCUTANEOUS
Qty: 0 | Refills: 0 | DISCHARGE
Start: 2020-03-30

## 2020-03-30 RX ORDER — INSULIN LISPRO 100/ML
4 VIAL (ML) SUBCUTANEOUS
Qty: 0 | Refills: 0 | DISCHARGE
Start: 2020-03-30

## 2020-03-30 RX ORDER — TRAMADOL HYDROCHLORIDE 50 MG/1
50 TABLET ORAL EVERY 6 HOURS
Refills: 0 | Status: DISCONTINUED | OUTPATIENT
Start: 2020-03-30 | End: 2020-03-31

## 2020-03-30 RX ORDER — ACETAMINOPHEN 500 MG
2 TABLET ORAL
Qty: 0 | Refills: 0 | DISCHARGE
Start: 2020-03-30

## 2020-03-30 RX ORDER — TRAMADOL HYDROCHLORIDE 50 MG/1
0.5 TABLET ORAL
Qty: 0 | Refills: 0 | DISCHARGE
Start: 2020-03-30

## 2020-03-30 RX ORDER — INSULIN GLARGINE 100 [IU]/ML
12 INJECTION, SOLUTION SUBCUTANEOUS AT BEDTIME
Refills: 0 | Status: DISCONTINUED | OUTPATIENT
Start: 2020-03-30 | End: 2020-03-31

## 2020-03-30 RX ORDER — SENNA PLUS 8.6 MG/1
2 TABLET ORAL
Qty: 0 | Refills: 0 | DISCHARGE
Start: 2020-03-30

## 2020-03-30 RX ADMIN — Medication 100 MILLIGRAM(S): at 06:41

## 2020-03-30 RX ADMIN — Medication 4: at 17:53

## 2020-03-30 RX ADMIN — Medication 4 UNIT(S): at 17:53

## 2020-03-30 RX ADMIN — Medication 20 MILLIGRAM(S): at 06:41

## 2020-03-30 RX ADMIN — Medication 2 TABLET(S): at 18:24

## 2020-03-30 RX ADMIN — Medication 3 UNIT(S): at 09:02

## 2020-03-30 RX ADMIN — TRAMADOL HYDROCHLORIDE 50 MILLIGRAM(S): 50 TABLET ORAL at 21:10

## 2020-03-30 RX ADMIN — Medication 4: at 09:02

## 2020-03-30 RX ADMIN — Medication 2: at 12:44

## 2020-03-30 RX ADMIN — INSULIN GLARGINE 12 UNIT(S): 100 INJECTION, SOLUTION SUBCUTANEOUS at 21:03

## 2020-03-30 RX ADMIN — ENOXAPARIN SODIUM 40 MILLIGRAM(S): 100 INJECTION SUBCUTANEOUS at 06:41

## 2020-03-30 RX ADMIN — Medication 2 TABLET(S): at 21:03

## 2020-03-30 RX ADMIN — Medication 975 MILLIGRAM(S): at 15:46

## 2020-03-30 RX ADMIN — Medication 975 MILLIGRAM(S): at 21:03

## 2020-03-30 RX ADMIN — Medication 100 MILLIGRAM(S): at 15:55

## 2020-03-30 RX ADMIN — Medication 4 UNIT(S): at 12:45

## 2020-03-30 RX ADMIN — Medication 975 MILLIGRAM(S): at 06:41

## 2020-03-30 RX ADMIN — TRAMADOL HYDROCHLORIDE 25 MILLIGRAM(S): 50 TABLET ORAL at 08:20

## 2020-03-30 NOTE — PROGRESS NOTE ADULT - ASSESSMENT
Romina Herrera DNP, NP-C  Cardiology   Spectra #[phone] 79 year old female with past medical history of HTN DM presenting from home after a mechanical fall in her yard resulting in L hip fracture.   Cardiology consulted for pre op risk assessment prior to intramedullary benjamin placement.  EKG revealing NSR 81bpm  Chest xray with small opacity in right upper lung field, non-specific    s/p Left IMN  - Followed by ortho.  follow recs  - Pain control  - D/C Planning to [facility]    HTN  - Slightly elevated 140-160 systolic likely reactive to pain   - Continue Enalapril 20mg daily   Monitor and replete electrolytes. Keep K>4.0 and Mg>2.0.    DM  - Management as per primary team     DVT PPX  - Per Primary    From a cardiac standpoint the patient may be discharged home  All other medical management as per primary team  Will continue to follow as inpatient    Romina Herrera DNP, NP-C  Cardiology   Spectra #[phone]

## 2020-03-30 NOTE — PROGRESS NOTE ADULT - SUBJECTIVE AND OBJECTIVE BOX
[facility] -- Kirsten Church [place], [place], Samuel Alvarez Savella, Goodger  Office # [phone]    Follow Up:      Subjective/Observations:     REVIEW OF SYSTEMS: All other review of systems is negative unless indicated above  PAST MEDICAL & SURGICAL HISTORY:  Benign essential HTN  DM (diabetes mellitus)  S/P cartilage surgery    MEDICATIONS  (STANDING):  acetaminophen   Tablet .. 975 milliGRAM(s) Oral every 8 hours  dextrose 5%. 1000 milliLiter(s) (50 mL/Hr) IV Continuous <Continuous>  dextrose 50% Injectable 25 Gram(s) IV Push once  enalapril 20 milliGRAM(s) Oral daily  enoxaparin Injectable 40 milliGRAM(s) SubCutaneous every 24 hours  influenza   Vaccine 0.5 milliLiter(s) IntraMuscular once  insulin glargine Injectable (LANTUS) 9 Unit(s) SubCutaneous at bedtime  insulin lispro (HumaLOG) corrective regimen sliding scale   SubCutaneous three times a day before meals  insulin lispro (HumaLOG) corrective regimen sliding scale   SubCutaneous at bedtime  insulin lispro Injectable (HumaLOG) 3 Unit(s) SubCutaneous three times a day with meals  lactated ringers. 1000 milliLiter(s) (75 mL/Hr) IV Continuous <Continuous>  senna 2 Tablet(s) Oral at bedtime    MEDICATIONS  (PRN):  artificial  tears Solution 1 Drop(s) Both EYES daily PRN Dry Eyes  benzocaine 15 mG/menthol 3.6 mG (Sugar-Free) Lozenge 1 Lozenge Oral every 4 hours PRN Sore Throat  calcium carbonate    500 mG (Tums) Chewable 2 Tablet(s) Chew three times a day PRN Heartburn  guaiFENesin   Syrup  (Sugar-Free) 100 milliGRAM(s) Oral every 6 hours PRN Cough  melatonin 3 milliGRAM(s) Oral at bedtime PRN Insomnia  morphine  - Injectable 2 milliGRAM(s) IV Push every 3 hours PRN breakthrough pain  ondansetron Injectable 4 milliGRAM(s) IV Push every 6 hours PRN Nausea and/or Vomiting  oxyCODONE    IR 2.5 milliGRAM(s) Oral every 4 hours PRN Moderate Pain (4 - 6)  oxyCODONE    IR 5 milliGRAM(s) Oral every 4 hours PRN Severe Pain (7 - 10)  traMADol 25 milliGRAM(s) Oral every 4 hours PRN Mild Pain (1 - 3)    Allergies    No Known Allergies    Intolerances      Vital Signs Last 24 Hrs  T(C): 36.9 (30 Mar 2020 07:24), Max: 36.9 (30 Mar 2020 07:24)  T(F): 98.5 (30 Mar 2020 07:24), Max: 98.5 (30 Mar 2020 07:24)  HR: 82 (30 Mar 2020 07:24) (82 - 89)  BP: 158/58 (30 Mar 2020 07:24) (147/62 - 165/72)  BP(mean): --  RR: 18 (30 Mar 2020 07:24) (18 - 18)  SpO2: 99% (30 Mar 2020 07:24) (97% - 99%)  I&O's Summary      PHYSICAL EXAM:  TELE: Not on tele  Constitutional: NAD, awake and alert, well-developed  HEENT: Moist Mucous Membranes, Anicteric  Pulmonary: Non-labored, breath sounds are clear bilaterally, No wheezing, rales or rhonchi  Cardiovascular: Regular, S1 and S2, + murmurs.  No rubs, gallops or clicks  Gastrointestinal: Bowel Sounds present, soft, nontender.   Lymph: No peripheral edema. No lymphadenopathy.  Skin: No visible rashes or ulcers.  Left hip dressing dry and intact  Psych:  Mood & affect appropriate  LABS: All Labs Reviewed:                        9.0    6.81  )-----------( 221      ( 30 Mar 2020 06:56 )             27.1                         9.1    9.18  )-----------( 218      ( 29 Mar 2020 10:23 )             27.6                         9.0    x     )-----------( x        ( 28 Mar 2020 14:27 )             27.0     30 Mar 2020 06:56    146    |  114    |  24     ----------------------------<  234    4.0     |  28     |  0.49   29 Mar 2020 10:23    144    |  111    |  27     ----------------------------<  297    3.5     |  30     |  0.70   28 Mar 2020 06:57    143    |  110    |  24     ----------------------------<  250    3.9     |  28     |  0.60     Ca    9.5        30 Mar 2020 06:56  Ca    9.4        29 Mar 2020 10:23  Ca    9.2        28 Mar 2020 06:57    < from: Xray Chest 1 View AP/PA (03.25.20 @ 14:42) >    EXAM:  XR CHEST AP OR PA 1V                          PROCEDURE DATE:  03/25/2020      INTERPRETATION:  INDICATION:Patient fell. Rule out injury.    COMPARISON: None available    FINDINGS:    Heart: The heart size is normal.    Mediastinum: Mediastinal contours are normal. There are no enlarged mediastinal or hilar nodes.    Lungs: The lungs are clear. Small and increased opacity seen in the right upper lung field overlying the right posterior sixth rib    Osseous structures: The osseous structures are intact. No sign of acute fracture. Shallow dextroscoliosis of the lower thoracic spine.    Soft tissues:There are no soft tissue abnormalities    Pleura: There are no pleural effusions.    IMPRESSION:    Small opacity in the right upper lung field, nonspecific.    No sign of rib fracture    SYLVIA DELA CRUZ M.D.; ATTENDING RADIOLOGIST  This document has been electronically signed. Mar 25 2020  2:54PM     < end of copied text >    < from: 12 Lead ECG (03.25.20 @ 14:04) >    Ventricular Rate 81 BPM    Atrial Rate 81 BPM    P-R Interval 128 ms    QRS Duration 74 ms    Q-T Interval 374 ms    QTC Calculation(Bezet) 434 ms    P Axis 72 degrees    R Axis 36 degrees    T Axis 41 degrees    Diagnosis Line Normal sinus rhythm  Normal ECG  No previous ECGs available  Confirmed by Lisa HAY, Sylvia ([id number]) on 3/25/2020 5:24:57 PM    < end of copied text > [facility] -- Kirsten Church, Rigo Gonzalez, Samuel Alvarez Savella, Goodger  Office # [phone]    Follow Up:  s/p Fall, Preop/Postop Optimization    Subjective/Observations: c/o left hip and back pain.  Sitting on the chair, comfortable on RA.  Denies any respiratory or cardiac discomfort    REVIEW OF SYSTEMS: All other review of systems is negative unless indicated above  PAST MEDICAL & SURGICAL HISTORY:  Benign essential HTN  DM (diabetes mellitus)  S/P cartilage surgery    MEDICATIONS  (STANDING):  acetaminophen   Tablet .. 975 milliGRAM(s) Oral every 8 hours  dextrose 5%. 1000 milliLiter(s) (50 mL/Hr) IV Continuous <Continuous>  dextrose 50% Injectable 25 Gram(s) IV Push once  enalapril 20 milliGRAM(s) Oral daily  enoxaparin Injectable 40 milliGRAM(s) SubCutaneous every 24 hours  influenza   Vaccine 0.5 milliLiter(s) IntraMuscular once  insulin glargine Injectable (LANTUS) 9 Unit(s) SubCutaneous at bedtime  insulin lispro (HumaLOG) corrective regimen sliding scale   SubCutaneous three times a day before meals  insulin lispro (HumaLOG) corrective regimen sliding scale   SubCutaneous at bedtime  insulin lispro Injectable (HumaLOG) 3 Unit(s) SubCutaneous three times a day with meals  lactated ringers. 1000 milliLiter(s) (75 mL/Hr) IV Continuous <Continuous>  senna 2 Tablet(s) Oral at bedtime    MEDICATIONS  (PRN):  artificial  tears Solution 1 Drop(s) Both EYES daily PRN Dry Eyes  benzocaine 15 mG/menthol 3.6 mG (Sugar-Free) Lozenge 1 Lozenge Oral every 4 hours PRN Sore Throat  calcium carbonate    500 mG (Tums) Chewable 2 Tablet(s) Chew three times a day PRN Heartburn  guaiFENesin   Syrup  (Sugar-Free) 100 milliGRAM(s) Oral every 6 hours PRN Cough  melatonin 3 milliGRAM(s) Oral at bedtime PRN Insomnia  morphine  - Injectable 2 milliGRAM(s) IV Push every 3 hours PRN breakthrough pain  ondansetron Injectable 4 milliGRAM(s) IV Push every 6 hours PRN Nausea and/or Vomiting  oxyCODONE    IR 2.5 milliGRAM(s) Oral every 4 hours PRN Moderate Pain (4 - 6)  oxyCODONE    IR 5 milliGRAM(s) Oral every 4 hours PRN Severe Pain (7 - 10)  traMADol 25 milliGRAM(s) Oral every 4 hours PRN Mild Pain (1 - 3)    Allergies    No Known Allergies    Intolerances    Vital Signs Last 24 Hrs  T(C): 36.9 (30 Mar 2020 07:24), Max: 36.9 (30 Mar 2020 07:24)  T(F): 98.5 (30 Mar 2020 07:24), Max: 98.5 (30 Mar 2020 07:24)  HR: 82 (30 Mar 2020 07:24) (82 - 89)  BP: 158/58 (30 Mar 2020 07:24) (147/62 - 165/72)  BP(mean): --  RR: 18 (30 Mar 2020 07:24) (18 - 18)  SpO2: 99% (30 Mar 2020 07:24) (97% - 99%)  I&O's Summary      PHYSICAL EXAM:  TELE: Not on tele  Constitutional: NAD, awake and alert, well-developed  HEENT: Moist Mucous Membranes, Anicteric  Pulmonary: Non-labored, breath sounds are clear bilaterally, No wheezing, rales or rhonchi  Cardiovascular: Regular, S1 and S2, + murmurs.  No rubs, gallops or clicks  Gastrointestinal: Bowel Sounds present, soft, nontender.   Lymph: No peripheral edema. No lymphadenopathy.  Skin: No visible rashes or ulcers.  Left hip dressing dry and intact  Psych:  Mood & affect appropriate  LABS: All Labs Reviewed:                        9.0    6.81  )-----------( 221      ( 30 Mar 2020 06:56 )             27.1                         9.1    9.18  )-----------( 218      ( 29 Mar 2020 10:23 )             27.6                         9.0    x     )-----------( x        ( 28 Mar 2020 14:27 )             27.0     30 Mar 2020 06:56    146    |  114    |  24     ----------------------------<  234    4.0     |  28     |  0.49   29 Mar 2020 10:23    144    |  111    |  27     ----------------------------<  297    3.5     |  30     |  0.70   28 Mar 2020 06:57    143    |  110    |  24     ----------------------------<  250    3.9     |  28     |  0.60     Ca    9.5        30 Mar 2020 06:56  Ca    9.4        29 Mar 2020 10:23  Ca    9.2        28 Mar 2020 06:57    < from: Xray Chest 1 View AP/PA (03.25.20 @ 14:42) >    EXAM:  XR CHEST AP OR PA 1V                          PROCEDURE DATE:  03/25/2020      INTERPRETATION:  INDICATION:Patient fell. Rule out injury.    COMPARISON: None available    FINDINGS:    Heart: The heart size is normal.    Mediastinum: Mediastinal contours are normal. There are no enlarged mediastinal or hilar nodes.    Lungs: The lungs are clear. Small and increased opacity seen in the right upper lung field overlying the right posterior sixth rib    Osseous structures: The osseous structures are intact. No sign of acute fracture. Shallow dextroscoliosis of the lower thoracic spine.    Soft tissues:There are no soft tissue abnormalities    Pleura: There are no pleural effusions.    IMPRESSION:    Small opacity in the right upper lung field, nonspecific.    No sign of rib fracture    SYLVIA DELA CRUZ M.D.; ATTENDING RADIOLOGIST  This document has been electronically signed. Mar 25 2020  2:54PM     < end of copied text >    < from: 12 Lead ECG (03.25.20 @ 14:04) >    Ventricular Rate 81 BPM    Atrial Rate 81 BPM    P-R Interval 128 ms    QRS Duration 74 ms    Q-T Interval 374 ms    QTC Calculation(Bezet) 434 ms    P Axis 72 degrees    R Axis 36 degrees    T Axis 41 degrees    Diagnosis Line Normal sinus rhythm  Normal ECG  No previous ECGs available  Confirmed by Lisa HAY, Sylvia ([id number]) on 3/25/2020 5:24:57 PM    < end of copied text >

## 2020-03-31 ENCOUNTER — TRANSCRIPTION ENCOUNTER (OUTPATIENT)
Age: 80
End: 2020-03-31

## 2020-03-31 VITALS
OXYGEN SATURATION: 98 % | DIASTOLIC BLOOD PRESSURE: 60 MMHG | SYSTOLIC BLOOD PRESSURE: 170 MMHG | TEMPERATURE: 98 F | HEART RATE: 95 BPM | RESPIRATION RATE: 18 BRPM

## 2020-03-31 PROCEDURE — C1889: CPT

## 2020-03-31 PROCEDURE — 97535 SELF CARE MNGMENT TRAINING: CPT

## 2020-03-31 PROCEDURE — 86901 BLOOD TYPING SEROLOGIC RH(D): CPT

## 2020-03-31 PROCEDURE — 72100 X-RAY EXAM L-S SPINE 2/3 VWS: CPT

## 2020-03-31 PROCEDURE — 97530 THERAPEUTIC ACTIVITIES: CPT

## 2020-03-31 PROCEDURE — 83036 HEMOGLOBIN GLYCOSYLATED A1C: CPT

## 2020-03-31 PROCEDURE — 36415 COLL VENOUS BLD VENIPUNCTURE: CPT

## 2020-03-31 PROCEDURE — 71045 X-RAY EXAM CHEST 1 VIEW: CPT

## 2020-03-31 PROCEDURE — 99285 EMERGENCY DEPT VISIT HI MDM: CPT

## 2020-03-31 PROCEDURE — 80048 BASIC METABOLIC PNL TOTAL CA: CPT

## 2020-03-31 PROCEDURE — 97110 THERAPEUTIC EXERCISES: CPT

## 2020-03-31 PROCEDURE — 85730 THROMBOPLASTIN TIME PARTIAL: CPT

## 2020-03-31 PROCEDURE — C1713: CPT

## 2020-03-31 PROCEDURE — 73502 X-RAY EXAM HIP UNI 2-3 VIEWS: CPT

## 2020-03-31 PROCEDURE — 80053 COMPREHEN METABOLIC PANEL: CPT

## 2020-03-31 PROCEDURE — 85610 PROTHROMBIN TIME: CPT

## 2020-03-31 PROCEDURE — 73552 X-RAY EXAM OF FEMUR 2/>: CPT

## 2020-03-31 PROCEDURE — 97116 GAIT TRAINING THERAPY: CPT

## 2020-03-31 PROCEDURE — 85014 HEMATOCRIT: CPT

## 2020-03-31 PROCEDURE — 86850 RBC ANTIBODY SCREEN: CPT

## 2020-03-31 PROCEDURE — 85027 COMPLETE CBC AUTOMATED: CPT

## 2020-03-31 PROCEDURE — 85018 HEMOGLOBIN: CPT

## 2020-03-31 PROCEDURE — 82962 GLUCOSE BLOOD TEST: CPT

## 2020-03-31 PROCEDURE — 99232 SBSQ HOSP IP/OBS MODERATE 35: CPT

## 2020-03-31 PROCEDURE — 97165 OT EVAL LOW COMPLEX 30 MIN: CPT

## 2020-03-31 PROCEDURE — 81001 URINALYSIS AUTO W/SCOPE: CPT

## 2020-03-31 PROCEDURE — C1769: CPT

## 2020-03-31 PROCEDURE — 97162 PT EVAL MOD COMPLEX 30 MIN: CPT

## 2020-03-31 PROCEDURE — 86900 BLOOD TYPING SEROLOGIC ABO: CPT

## 2020-03-31 PROCEDURE — 93005 ELECTROCARDIOGRAM TRACING: CPT

## 2020-03-31 PROCEDURE — 76000 FLUOROSCOPY <1 HR PHYS/QHP: CPT

## 2020-03-31 RX ADMIN — Medication 4 UNIT(S): at 08:31

## 2020-03-31 RX ADMIN — Medication 4 UNIT(S): at 12:35

## 2020-03-31 RX ADMIN — Medication 2: at 08:30

## 2020-03-31 RX ADMIN — ENOXAPARIN SODIUM 40 MILLIGRAM(S): 100 INJECTION SUBCUTANEOUS at 05:20

## 2020-03-31 RX ADMIN — TRAMADOL HYDROCHLORIDE 50 MILLIGRAM(S): 50 TABLET ORAL at 08:16

## 2020-03-31 RX ADMIN — Medication 4 UNIT(S): at 17:36

## 2020-03-31 RX ADMIN — TRAMADOL HYDROCHLORIDE 50 MILLIGRAM(S): 50 TABLET ORAL at 17:04

## 2020-03-31 RX ADMIN — Medication 2: at 12:35

## 2020-03-31 RX ADMIN — Medication 975 MILLIGRAM(S): at 05:20

## 2020-03-31 RX ADMIN — Medication 975 MILLIGRAM(S): at 14:00

## 2020-03-31 RX ADMIN — Medication 6: at 17:36

## 2020-03-31 RX ADMIN — Medication 20 MILLIGRAM(S): at 05:20

## 2020-03-31 NOTE — PROGRESS NOTE ADULT - SUBJECTIVE AND OBJECTIVE BOX
From intake H&P:  Pt is a 78 yo F presenting from home after a mechanical fall in her yard resulting in L hip fracture. PMH DM2 and pinched nerve, HTN.   Patient seen and examined at bedside. Patient states that today she was in her yard today with her dog when she fell on her left side due to some sand bags being on the floor. She denies any head trauma, syncope or pre-syncopal symptoms. She tripped due to the sand bags being on the floor. She has L hip pain 10/10 in severity worse with movement, improved by laying still, pain is sharp. No other complaints. Denies headaches, nausea, vomiting, chest pain, SOB, palpitations, abdominal pain, constipation, diarrhea, melena, hematochezia, dysuria. She has never had any adverse reactions to anesthesia in the past. She denies past hx of TIA/MI/CVA.   In ED patient noted to have L hip fracture. (25 Mar 2020 16:00)    Subjective - Today, 3/31/20:   Patient seen and examined at bedside.  No overnight events. Pain is controlled. Complains of left leg swelling and bruising. Tolerating diet, denies N/V.  Denies CP/SOB/KEBEDE/palpitations, dizziness/lightheadedness.       REVIEW OF SYSTEMS:  CONSTITUTIONAL: Denies f/c  EYES/ENT: mild throat dryness/discomfort  RESPIRATORY: No cough, wheezing, hemoptysis; No shortness of breath  CARDIOVASCULAR: No chest pain or palpitations  GASTROINTESTINAL: No abdominal, nausea, vomiting, or hematemesis; No diarrhea or constipation. No melena or hematochezia.  NEUROLOGICAL: No dizziness, numbness, or weakness  SKIN: No itching, burning, rashes, or lesions   All other review of systems is negative unless indicated above.      MEDICATIONS  (STANDING):  acetaminophen   Tablet .. 975 milliGRAM(s) Oral every 8 hours  dextrose 5%. 1000 milliLiter(s) (50 mL/Hr) IV Continuous <Continuous>  dextrose 50% Injectable 25 Gram(s) IV Push once  enalapril 20 milliGRAM(s) Oral daily  enoxaparin Injectable 40 milliGRAM(s) SubCutaneous every 24 hours  influenza   Vaccine 0.5 milliLiter(s) IntraMuscular once  insulin glargine Injectable (LANTUS) 12 Unit(s) SubCutaneous at bedtime  insulin lispro (HumaLOG) corrective regimen sliding scale   SubCutaneous three times a day before meals  insulin lispro (HumaLOG) corrective regimen sliding scale   SubCutaneous at bedtime  insulin lispro Injectable (HumaLOG) 4 Unit(s) SubCutaneous three times a day before meals  lactated ringers. 1000 milliLiter(s) (75 mL/Hr) IV Continuous <Continuous>  senna 2 Tablet(s) Oral at bedtime    MEDICATIONS  (PRN):  artificial  tears Solution 1 Drop(s) Both EYES daily PRN Dry Eyes  benzocaine 15 mG/menthol 3.6 mG (Sugar-Free) Lozenge 1 Lozenge Oral every 4 hours PRN Sore Throat  calcium carbonate    500 mG (Tums) Chewable 2 Tablet(s) Chew three times a day PRN Heartburn  guaiFENesin   Syrup  (Sugar-Free) 100 milliGRAM(s) Oral every 6 hours PRN Cough  melatonin 3 milliGRAM(s) Oral at bedtime PRN Insomnia  ondansetron Injectable 4 milliGRAM(s) IV Push every 6 hours PRN Nausea and/or Vomiting  traMADol 25 milliGRAM(s) Oral every 6 hours PRN Moderate Pain (4 - 6)  traMADol 50 milliGRAM(s) Oral every 6 hours PRN Severe Pain (7 - 10)        Vital Signs Last 24 Hrs  T(C): 36.7 (31 Mar 2020 07:35), Max: 36.9 (31 Mar 2020 05:18)  T(F): 98 (31 Mar 2020 07:35), Max: 98.5 (31 Mar 2020 05:18)  HR: 82 (31 Mar 2020 07:35) (76 - 90)  BP: 176/65 (31 Mar 2020 07:35) (150/63 - 180/68)  BP(mean): --  RR: 17 (31 Mar 2020 07:35) (17 - 18)  SpO2: 98% (31 Mar 2020 07:35) (98% - 100%)    PHYSICAL EXAM  GENERAL: no acute distress  HEENT: NC/AT, EOMI, neck supple  RESPIRATORY: CTA bilat w/o W/R/R  CARDIOVASCULAR: RRR, no murmurs, gallops, rubs  ABDOMINAL: soft, non-tender, non-distended, positive bowel sounds   EXTREMITIES: SCD in place.  Distal pulses intact.  Good sensation b/l.  Able to move her toes. Calves soft/NT bilat LE's.   NEUROLOGICAL: alert and oriented x 3, non-focal  SKIN: no rashes or lesions   MUSCULOSKELETAL: Aquacel dressing intact on hip and near knee.  extensive ecchymoses over left thigh                Labs:                       9.0    6.81  )-----------( 221      ( 30 Mar 2020 06:56 )             27.1   03-30    146<H>  |  114<H>  |  24<H>  ----------------------------<  234<H>  4.0   |  28  |  0.49<L>    Ca    9.5      30 Mar 2020 06:56    CAPILLARY BLOOD GLUCOSE  POCT Blood Glucose.: 245 mg/dL (30 Mar 2020 21:02)  POCT Blood Glucose.: 225 mg/dL (30 Mar 2020 16:56)  POCT Blood Glucose.: 170 mg/dL (30 Mar 2020 12:39)  POCT Blood Glucose.: 209 mg/dL (30 Mar 2020 08:48)

## 2020-03-31 NOTE — PROGRESS NOTE ADULT - ASSESSMENT
79 year old female with past medical history of HTN DM presenting from home after a mechanical fall in her yard resulting in L hip fracture.   Cardiology consulted for pre op risk assessment prior to intramedullary benjamin placement.  EKG revealing NSR 81bpm  Chest xray with small opacity in right upper lung field, non-specific    s/p Left IMN  - Followed by ortho.  follow recs  - Pain control  - D/C Planning to [facility]    HTN  - Slightly elevated 140-180 systolic likely reactive to pain   - Continue Enalapril 20mg daily   Monitor and replete electrolytes. Keep K>4.0 and Mg>2.0.    DM  - Management as per primary team     DVT PPX  - Per Primary    From a cardiac standpoint the patient may be discharged home  All other medical management as per primary team  Will continue to follow as inpatient    Lefty Winston DNP  Cardiology   Spectra #[phone]

## 2020-03-31 NOTE — PROGRESS NOTE ADULT - SUBJECTIVE AND OBJECTIVE BOX
[facility] -- Kirsten Church, Lisa, Rigo, Samuel Alvarez Savella  Office # [phone]      Follow Up:    S/P fall, Preop eval/Post follow up   Subjective/Observations:   No events overnight resting comfortably in bed.  No complaints of chest pain, dyspnea, or palpitations reported. No signs of orthopnea or PND.     REVIEW OF SYSTEMS: All other review of systems is negative unless indicated above    PAST MEDICAL & SURGICAL HISTORY:  Benign essential HTN  DM (diabetes mellitus)  S/P cartilage surgery      MEDICATIONS  (STANDING):  acetaminophen   Tablet .. 975 milliGRAM(s) Oral every 8 hours  dextrose 5%. 1000 milliLiter(s) (50 mL/Hr) IV Continuous <Continuous>  dextrose 50% Injectable 25 Gram(s) IV Push once  enalapril 20 milliGRAM(s) Oral daily  enoxaparin Injectable 40 milliGRAM(s) SubCutaneous every 24 hours  influenza   Vaccine 0.5 milliLiter(s) IntraMuscular once  insulin glargine Injectable (LANTUS) 12 Unit(s) SubCutaneous at bedtime  insulin lispro (HumaLOG) corrective regimen sliding scale   SubCutaneous three times a day before meals  insulin lispro (HumaLOG) corrective regimen sliding scale   SubCutaneous at bedtime  insulin lispro Injectable (HumaLOG) 4 Unit(s) SubCutaneous three times a day before meals  senna 2 Tablet(s) Oral at bedtime    MEDICATIONS  (PRN):  artificial  tears Solution 1 Drop(s) Both EYES daily PRN Dry Eyes  benzocaine 15 mG/menthol 3.6 mG (Sugar-Free) Lozenge 1 Lozenge Oral every 4 hours PRN Sore Throat  calcium carbonate    500 mG (Tums) Chewable 2 Tablet(s) Chew three times a day PRN Heartburn  guaiFENesin   Syrup  (Sugar-Free) 100 milliGRAM(s) Oral every 6 hours PRN Cough  melatonin 3 milliGRAM(s) Oral at bedtime PRN Insomnia  ondansetron Injectable 4 milliGRAM(s) IV Push every 6 hours PRN Nausea and/or Vomiting  traMADol 25 milliGRAM(s) Oral every 6 hours PRN Moderate Pain (4 - 6)  traMADol 50 milliGRAM(s) Oral every 6 hours PRN Severe Pain (7 - 10)      Allergies    No Known Allergies    Intolerances        Vital Signs Last 24 Hrs  T(C): 36.7 (31 Mar 2020 07:35), Max: 36.9 (31 Mar 2020 05:18)  T(F): 98 (31 Mar 2020 07:35), Max: 98.5 (31 Mar 2020 05:18)  HR: 82 (31 Mar 2020 07:35) (76 - 90)  BP: 176/65 (31 Mar 2020 07:35) (150/63 - 180/68)  BP(mean): --  RR: 17 (31 Mar 2020 07:35) (17 - 18)  SpO2: 98% (31 Mar 2020 07:35) (98% - 100%)    I&O's Summary        PHYSICAL EXAM:  TELE: Off tele   Constitutional: NAD, awake and alert, well-developed  HEENT: Moist Mucous Membranes, Anicteric  Pulmonary: Non-labored, breath sounds are clear bilaterally, No wheezing, crackles or rhonchi  Cardiovascular: Regular, S1 and S2 nl, No murmurs, rubs, gallops or clicks  Gastrointestinal: Bowel Sounds present, soft, nontender.   Lymph: No lymphadenopathy. No peripheral edema.  Skin: No visible rashes or ulcers.  Psych:  Mood & affect appropriate    LABS: All Labs Reviewed:                        9.0    6.81  )-----------( 221      ( 30 Mar 2020 06:56 )             27.1                         9.1    9.18  )-----------( 218      ( 29 Mar 2020 10:23 )             27.6                         9.0    x     )-----------( x        ( 28 Mar 2020 14:27 )             27.0     30 Mar 2020 06:56    146    |  114    |  24     ----------------------------<  234    4.0     |  28     |  0.49   29 Mar 2020 10:23    144    |  111    |  27     ----------------------------<  297    3.5     |  30     |  0.70     Ca    9.5        30 Mar 2020 06:56  Ca    9.4        29 Mar 2020 10:23         from: Xray Chest 1 View AP/PA (03.25.20 @ 14:42) >    EXAM:  XR CHEST AP OR PA 1V                          PROCEDURE DATE:  03/25/2020      INTERPRETATION:  INDICATION:Patient fell. Rule out injury.    COMPARISON: None available    FINDINGS:    Heart: The heart size is normal.    Mediastinum: Mediastinal contours are normal. There are no enlarged mediastinal or hilar nodes.    Lungs: The lungs are clear. Small and increased opacity seen in the right upper lung field overlying the right posterior sixth rib    Osseous structures: The osseous structures are intact. No sign of acute fracture. Shallow dextroscoliosis of the lower thoracic spine.    Soft tissues:There are no soft tissue abnormalities    Pleura: There are no pleural effusions.    IMPRESSION:    Small opacity in the right upper lung field, nonspecific.    No sign of rib fracture    SYLVIA DELA CRUZ M.D.; ATTENDING RADIOLOGIST  This document has been electronically signed. Mar 25 2020  2:54PM     < end of copied text >    < from: 12 Lead ECG (03.25.20 @ 14:04) >    Ventricular Rate 81 BPM    Atrial Rate 81 BPM    P-R Interval 128 ms    QRS Duration 74 ms    Q-T Interval 374 ms    QTC Calculation(Bezet) 434 ms    P Axis 72 degrees    R Axis 36 degrees    T Axis 41 degrees    Diagnosis Line Normal sinus rhythm  Normal ECG  No previous ECGs available  Confirmed by Sylvia Gonzalez MD ([id number]) on 3/25/2020 5:24:57 PM    < end of copied text >

## 2020-03-31 NOTE — PROGRESS NOTE ADULT - PROBLEM SELECTOR PLAN 1
Pt taken to OR for intertrochanteric/gamma nail placement with Dr. Santoyo 3/26/2020  -Continue PT/OOB/Ambulation as tolerated  -Pain control with Tylenol or Tramadol  -Lovenox for DVT PPX  -Ortho recs  -Keep dressing in place  -Will monitor labs  -D/C planning: Patient will now be going home with HCPT. She will need a rolling walker.

## 2020-03-31 NOTE — PROGRESS NOTE ADULT - PROBLEM SELECTOR PLAN 3
On daily insulin at home- Novolog  Continue sliding scale  Continue Lantus 12 units qHS and Humalog 4 units TID with meals  Hypoglycemia protocol ordered  Accuchecks pre meals and before bed  HGB A1C- 7.2(3/25/2020)  Diabetes education  Monitor blood glucose.   Consistent carb with Dash/TLC diet.

## 2020-03-31 NOTE — CHART NOTE - NSCHARTNOTEFT_GEN_A_CORE
Based on patient's ongoing issues with deconditioning and generalized weakness secondary to patient's left hip fracture. Based on patient's ongoing issues with deconditioning and generalized weakness secondary to left hip fracture, patient will require a semi electric hospital bed. This is necessary to achieve positioning and elevation not able to obtain in an ordinary bed. Additionally, patient has 5 steps from ground floor to access her bedroom or bathroom in her home, and will not be able to access either, making the bed and also a bedside commode necessary at this time. Given the current COVID-19 crisis, these efforts to maximize home safety and decrease risk for rehospitalization are in the patient's best interest in efforts to minimize potential exposure to the virus, and are considered medically necessary at this time.

## 2020-03-31 NOTE — PROGRESS NOTE ADULT - REASON FOR ADMISSION
Hip fracture

## 2020-03-31 NOTE — DISCHARGE NOTE NURSING/CASE MANAGEMENT/SOCIAL WORK - PATIENT PORTAL LINK FT
You can access the FollowMyHealth Patient Portal offered by [facility] by registering at the following website: [email or web address]. By joining [facility]’s FollowMyHealth portal, you will also be able to view your health information using other applications (apps) compatible with our system.

## 2020-03-31 NOTE — PROGRESS NOTE ADULT - NSHPATTENDINGPLANDISCUSS_GEN_ALL_CORE
patient and daughter re: ambulation, anemia, monitoring blood levels, anticipated hospital course, discharge planning

## 2021-07-08 NOTE — DISCHARGE NOTE PROVIDER - CARE PROVIDER_API CALL
Detail Level: Generalized Detail Level: Detailed Detail Level: Zone Lucy Santoyo)  Orthopaedic Surgery Surgery  [address] [place] [address]  Phone: [phone]  Fax: [phone]  Follow Up Time:

## 2022-07-05 ENCOUNTER — EMERGENCY (EMERGENCY)
Facility: HOSPITAL | Age: 82
LOS: 1 days | Discharge: HOME | End: 2022-07-05
Attending: STUDENT IN AN ORGANIZED HEALTH CARE EDUCATION/TRAINING PROGRAM
Payer: MEDICARE

## 2022-07-05 VITALS
SYSTOLIC BLOOD PRESSURE: 181 MMHG | HEIGHT: 67 IN | RESPIRATION RATE: 16 BRPM | OXYGEN SATURATION: 100 % | HEART RATE: 82 BPM | TEMPERATURE: 98 F | DIASTOLIC BLOOD PRESSURE: 84 MMHG

## 2022-07-05 DIAGNOSIS — Z98.890 OTHER SPECIFIED POSTPROCEDURAL STATES: Chronic | ICD-10-CM

## 2022-07-05 PROCEDURE — 82962 GLUCOSE BLOOD TEST: CPT

## 2022-07-05 PROCEDURE — 73522 X-RAY EXAM HIPS BI 3-4 VIEWS: CPT | Mod: 26

## 2022-07-05 PROCEDURE — 71250 CT THORAX DX C-: CPT | Mod: MA

## 2022-07-05 PROCEDURE — 71046 X-RAY EXAM CHEST 2 VIEWS: CPT | Mod: 26

## 2022-07-05 PROCEDURE — 99284 EMERGENCY DEPT VISIT MOD MDM: CPT | Mod: FS

## 2022-07-05 PROCEDURE — 73522 X-RAY EXAM HIPS BI 3-4 VIEWS: CPT

## 2022-07-05 PROCEDURE — 71250 CT THORAX DX C-: CPT | Mod: 26,MA

## 2022-07-05 PROCEDURE — 71046 X-RAY EXAM CHEST 2 VIEWS: CPT

## 2022-07-05 PROCEDURE — 99284 EMERGENCY DEPT VISIT MOD MDM: CPT | Mod: 25

## 2022-07-05 RX ORDER — ACETAMINOPHEN 500 MG
650 TABLET ORAL ONCE
Refills: 0 | Status: COMPLETED | OUTPATIENT
Start: 2022-07-05 | End: 2022-07-05

## 2022-07-05 RX ADMIN — Medication 650 MILLIGRAM(S): at 19:28

## 2022-07-05 NOTE — ED PROVIDER NOTE - PROGRESS NOTE DETAILS
trauma surgery paged x2 to discuss case given CT read of "several" rib fractures in an 83yo F, pt pulling 1750cc on IS. Pt and family updated on plan for trauma surgery evaluation, all questions answered, pt pain controlled at this time. -Jesse Padilla PA-C c/d/w trauma surgery, will see pt in ED. Attending note (Victor Manuel): went to see patient in blue, was informed by ANDRES Jaquez that patient had eloped and did not want to wait longer to be seen by physician. ANDRES Jaquez also informed me that trauma surgery was down to see patient shortly before my arrival but after patient had eloped.  Patient had no reported iv in place.  Patient eloped.  Per RN, she called a cab and walked out. Attending note (Victor Manuel): informed by RN that patient is declining any lab work and requesting to leave. Paged and spoke to trauma surgery, states someone is on the way to see her now, will be there within 30 minutes.  Spoke with blue RN and told her I will be over to see patient shortly.

## 2022-07-05 NOTE — ED PROVIDER NOTE - PHYSICAL EXAMINATION
CONSTITUTIONAL: Patient is awake, alert and oriented x 3. Patient appears younger than stated age;   HEAD: NCAT  EYES: PERRL bilaterally, EOMI,   ENT: Airway patent, Nasal mucosa clear  NECK: Supple  LUNGS: CTA B/L, no wheezes, rhonci or rales  HEART: RRR.+S1S2   MSK: No edema or calf tenderness b/l, FROM upper and lower ext b/l, ambulatory in ED; no midline neck/spine ttp, (+) right sided anterior chest wall ttp;   SKIN: abrasion to right forearm;   NEURO:  No focal deficits, Strength5/5 UE and LE b/l; Sensation intact;

## 2022-07-05 NOTE — ED ADULT NURSE NOTE - NS ED NURSE ELOPE COMMENTS
Pt did not want to wait for ED MD or surgery team. Pt wants to see PMD for follow up, spoke with pt's  Renan on the phone, wants pt home. Pt assisted in obtaining a cab service to leave.

## 2022-07-05 NOTE — ED PROVIDER NOTE - CLINICAL SUMMARY MEDICAL DECISION MAKING FREE TEXT BOX
This is a 82 year old female presenting for right rib pain and back pain after mechanical fall today with tenderness over the right lower ribs.     Differentials include but are not limited to: fracture, pneumothorax, sprain, strain, contusion, duodenal rupture.     CXR unremarkable however if concern for fx , order CT. Due to point tenderness in right lower rib, CT Chest ordered. CT Chest showed possible several nondisplaced rib fractures but did not specifiy how many. IS 1750cc. Trauma surgery called x 2 and I discussed the results and answered all questions with the patient.     Case signed out to Dr. Rush pending trauma surgery recs

## 2022-07-05 NOTE — ED ADULT NURSE NOTE - OBJECTIVE STATEMENT
Patient is an 81 y/o female with PMH of broken left hip, DM, HTN presenting to the ED via EMS with c/o fall today. Patient states she is supposed to ambulate with a cane but normally does not, patient states she was putting groceries away and when tripped and fell into the table and landed on the floor. Patient was able to get up and ambulate, patient landed on right side, patient reports right rib pain worsening when taking a deep breath. Patient denies head trauma, denies LOC, denies blood thinners. Patient A&Ox3, breathing unlabored, denies SOB, denies chest pain, peripheral pulses intact, cap refill less than 2 secs, denies headache/vision changes/dizziness, denies n/v/d/c, denies urinary symptoms, full ROM in all extremities, skin warm, abrasion noted on right wrist, denies fever, cough, or chills.

## 2022-07-05 NOTE — ED PROVIDER NOTE - SHIFT CHANGE DETAILS
Attending note (Victor Manuel): I have received sign out on this patient, briefly: 83y/o F presenting after fall two days ago, found on CT to have multiple acute vs old rib fractures; per signout is breathing well, with no signs of resp distress, no hypoxia, no splinting; 1700 on incent spirometer; is pending trauma surgery consult. Labs ordered for possible admission given multiple rib fx and age.  I have called charge RN requesting patient be moved from blue section to my area (gold). Charge RN aware.

## 2022-07-05 NOTE — ED ADULT NURSE REASSESSMENT NOTE - NS ED NURSE REASSESS COMMENT FT1
Pt received from ANDRES Mckeon. Pt well appearing, VSS. Given incentive spirometer and instructed in use. Pt pulling 7031-0569 on IS. A&Ox4, REDDY, lungs clear, distal pulses intact, abdomen soft, skin intact. Side rails up for safety, call bell and personal items within reach, instructed to call for assistance, verbalizes understanding. Will continue to monitor. Pending surgical consult.

## 2022-07-05 NOTE — ED ADULT NURSE NOTE - NSIMPLEMENTINTERV_GEN_ALL_ED
Implemented All Fall Risk Interventions:  [place] to call system. Call bell, personal items and telephone within reach. Instruct patient to call for assistance. Room bathroom lighting operational. Non-slip footwear when patient is off stretcher. Physically safe environment: no spills, clutter or unnecessary equipment. Stretcher in lowest position, wheels locked, appropriate side rails in place. Provide visual cue, wrist band, yellow gown, etc. Monitor gait and stability. Monitor for mental status changes and reorient to person, place, and time. Review medications for side effects contributing to fall risk. Reinforce activity limits and safety measures with patient and family.

## 2022-07-05 NOTE — ED PROVIDER NOTE - ATTENDING APP SHARED VISIT CONTRIBUTION OF CARE
I, Edgar Rodriguez, performed a history and physical exam of the patient and discussed their management with the resident and /or advanced care provider. I reviewed the resident and /or ACP's note and agree with the documented findings and plan of care. I was present and available for all procedures.    Patient presents for right rib and lower back pain after mechanical fall. She was placing groceries on the table and tripped over something, falling on her right side. Denies dizziness, syncope, chest pain, weakness prior to fall. No blood thinners. I, Edgar Rodriguez, performed a history and physical exam of the patient and discussed their management with the resident and /or advanced care provider. I reviewed the resident and /or ACP's note and agree with the documented findings and plan of care. I was present and available for all procedures.

## 2022-07-05 NOTE — ED PROVIDER NOTE - OBJECTIVE STATEMENT
81 y/o female y/o female with PMHx of HTN, DM, left hip fracture presents to the ED complaining of mechanical trip and fall today around noon. Patient states that she was placing groceries on the kitchen table and accidently tripped and fell. She landed on her right side on the ground. Did not hit her head or pass out. Does not take any blood thinners. She was ambulatory after the fall. She complains of abrasion to right forearm and pain to the right side of her chest. No head pain or neck pain. Denies any fevers, chills, SOB, cough, n/v/d, dizziness, weakness.

## 2022-07-06 VITALS
HEART RATE: 81 BPM | TEMPERATURE: 98 F | OXYGEN SATURATION: 99 % | SYSTOLIC BLOOD PRESSURE: 152 MMHG | RESPIRATION RATE: 18 BRPM | DIASTOLIC BLOOD PRESSURE: 82 MMHG

## 2022-07-06 PROBLEM — E11.9 TYPE 2 DIABETES MELLITUS WITHOUT COMPLICATIONS: Chronic | Status: ACTIVE | Noted: 2020-03-25

## 2022-07-06 PROBLEM — I10 ESSENTIAL (PRIMARY) HYPERTENSION: Chronic | Status: ACTIVE | Noted: 2020-03-25

## 2022-07-06 NOTE — ED ADULT NURSE REASSESSMENT NOTE - NS ED NURSE REASSESS COMMENT FT1
Pt verbalizes wanting to leave and seek follow up with PMD. Spoke with pt's  Renan on phone, wants pt to come home. Spoke with MD MATEUSZ Huggins and TEJAL Rush, informed pt wants to leave. ED MDs state they will come see pt. Pt does not want to wait, pt left ED via cab service.

## 2022-07-06 NOTE — CHART NOTE - NSCHARTNOTEFT_GEN_A_CORE
Trauma Surgery went to see patient in ED, but patient had left AMA moments before being seen by our team. Trauma Surgery went to see patient in ED, but patient had left AMA moments before being seen by our team.      ACS/Trauma Surgery  [id number]

## 2022-12-19 NOTE — PRE-OP CHECKLIST - STERILIZATION AFFIRMATION
----- Message from KENNEDY Edwards n/a

## 2024-03-08 NOTE — ED ADULT NURSE NOTE - DOES PATIENT HAVE ADVANCE DIRECTIVE
No (+) eye tracking bilaterally to right to midline, pt does not track past midline to left (+) finger to thumb coordination intact in RUE, mildly impaired in LUE, (+) tremors (? spasticity) noted at end shoulder/elbow flexion ROM in LUE and when standing in RUE with UE in full flexed position.

## 2024-03-11 ENCOUNTER — APPOINTMENT (OUTPATIENT)
Dept: ORTHOPEDIC SURGERY | Facility: CLINIC | Age: 84
End: 2024-03-11
Payer: MEDICARE

## 2024-03-11 VITALS — HEIGHT: 66 IN | BODY MASS INDEX: 19.29 KG/M2 | WEIGHT: 120 LBS

## 2024-03-11 DIAGNOSIS — S46.011A STRAIN OF MUSCLE(S) AND TENDON(S) OF THE ROTATOR CUFF OF RIGHT SHOULDER, INITIAL ENCOUNTER: ICD-10-CM

## 2024-03-11 PROCEDURE — 73030 X-RAY EXAM OF SHOULDER: CPT | Mod: RT

## 2024-03-11 PROCEDURE — 99203 OFFICE O/P NEW LOW 30 MIN: CPT

## 2024-03-11 PROCEDURE — 73010 X-RAY EXAM OF SHOULDER BLADE: CPT | Mod: RT

## 2024-03-11 NOTE — IMAGING
[de-identified] : L shoulder: - No obvious deformity or swelling - No pain with palpation over AC joint, anterior or posterior shoulder - Forward flexion 180 degrees, External rotation to 30 degrees, Internal rotation to L spine - 5/5 strength with internal and external rotation - Negative Empty can - Distally neurovascularly intact     R shoulder: - No obvious deformity or swelling - Pain over humeral head laterally - No pain with palpation over AC joint, anterior or posterior shoulder - Forward flexion to 80 degrees, External rotation to 20 degrees, Internal rotation to hip - 4/5 strength with internal and external rotation - Positive Empty can - Positive impingement testing - Distally neurovascularly intact    [Right] : right shoulder [There are no fractures, subluxations or dislocations. No significant abnormalities are seen] : There are no fractures, subluxations or dislocations. No significant abnormalities are seen [Degenerative change] : Degenerative change

## 2024-03-11 NOTE — HISTORY OF PRESENT ILLNESS
[8] : 8 [Sharp] : sharp [4] : 4 [Meds] : meds [Intermittent] : intermittent [de-identified] : 03/11/2024: Patient is an 83-year-old female presenting for evaluation of right shoulder pain that started on 3/9/2024 after falling onto her right shoulder while getting out of the car.  She has some pain at rest and pain that worsens with activities where she tries to lift her arm above the head.  She denies any radiation of pain.  She has been taking aspirin without much improvement.  She went to [facility] urgent care where she had x-rays completed without signs of fracture.  She denies any history of shoulder problems in the past. Non-smoker, she is currently taking Aspirin, Type 2 Diabetic.          [FreeTextEntry1] : right shoulder  [] : no [de-identified] : certain movements, lifting

## 2024-03-11 NOTE — ASSESSMENT
[FreeTextEntry1] : Right shoulder pain over the last 2 days after a fall out of the car.  X-rays without signs of fracture.  Exam is consistent with likely rotator cuff injury unsure of extent given guarding on exam. - Home PT ordered due to patient limitations with getting out of the house and relying on family for transportation.  - Recommended taking Tylenol up to 3 times daily as needed - Recommend icing twice daily - Follow-up in 3 to 4 weeks.  If not improving at this time, can consider corticosteroid injection versus MRI if this is something she would like further evaluated

## 2024-03-11 NOTE — PHYSICAL EXAM
[de-identified] : Constitutional: Well developed, well nourished, able to communicate Neuro: Normal sensation, No focal deficits Skin: Intact CV: Peripheral vascular exam grossly normal Pulm: No signs of respiratory distress Psych: Oriented, normal mood and affect

## 2024-03-30 ENCOUNTER — TRANSCRIPTION ENCOUNTER (OUTPATIENT)
Age: 84
End: 2024-03-30

## 2024-04-01 ENCOUNTER — APPOINTMENT (OUTPATIENT)
Dept: ORTHOPEDIC SURGERY | Facility: CLINIC | Age: 84
End: 2024-04-01

## 2025-02-19 NOTE — ED ADULT NURSE NOTE - NS ED NURSE LEVEL OF CONSCIOUSNESS AFFECT
RECORDS RECEIVED FROM: internal     DATE RECEIVED: 2.19.25     NOTES STATUS DETAILS   OFFICE NOTE from referring provider internal    Galindo Latif MD      DISCHARGE REPORT from the ER internal  12.22.24 Lance  11.28.24 Rhonda   7/7/24 Rhoda   4.3.24 Allen   10.23.23 Oljuan luis    MEDICATION LIST internal      IMAGING  (NEED IMAGES AND REPORTS)       CT SCAN internal  12.22.24, 7/7/24, 4/7/24, 10.25.23, 9.12.23, 4.22.23   CHEST XRAY (CXR) internal  12.22.24, 11.28.24, 9.16.24, 9.14.24, 8.10.24, 7.13.24 more in epic    TESTS       PULMONARY FUNCTION TESTING (PFT) internal  Scheduled 2.19.25             
Calm